# Patient Record
Sex: FEMALE | Race: WHITE | URBAN - METROPOLITAN AREA
[De-identification: names, ages, dates, MRNs, and addresses within clinical notes are randomized per-mention and may not be internally consistent; named-entity substitution may affect disease eponyms.]

---

## 2023-04-26 ENCOUNTER — EMERGENCY (EMERGENCY)
Facility: HOSPITAL | Age: 78
LOS: 0 days | Discharge: LEFT BEFORE DISCHARGE | End: 2023-04-26
Attending: EMERGENCY MEDICINE
Payer: MEDICARE

## 2023-04-26 VITALS
RESPIRATION RATE: 12 BRPM | SYSTOLIC BLOOD PRESSURE: 105 MMHG | TEMPERATURE: 98 F | HEART RATE: 71 BPM | OXYGEN SATURATION: 98 % | DIASTOLIC BLOOD PRESSURE: 78 MMHG

## 2023-04-26 DIAGNOSIS — Z20.822 CONTACT WITH AND (SUSPECTED) EXPOSURE TO COVID-19: ICD-10-CM

## 2023-04-26 DIAGNOSIS — C77.0 SECONDARY AND UNSPECIFIED MALIGNANT NEOPLASM OF LYMPH NODES OF HEAD, FACE AND NECK: ICD-10-CM

## 2023-04-26 DIAGNOSIS — Z85.51 PERSONAL HISTORY OF MALIGNANT NEOPLASM OF BLADDER: ICD-10-CM

## 2023-04-26 DIAGNOSIS — R20.2 PARESTHESIA OF SKIN: ICD-10-CM

## 2023-04-26 DIAGNOSIS — R29.700 NIHSS SCORE 0: ICD-10-CM

## 2023-04-26 DIAGNOSIS — Z53.29 PROCEDURE AND TREATMENT NOT CARRIED OUT BECAUSE OF PATIENT'S DECISION FOR OTHER REASONS: ICD-10-CM

## 2023-04-26 DIAGNOSIS — R22.2 LOCALIZED SWELLING, MASS AND LUMP, TRUNK: ICD-10-CM

## 2023-04-26 DIAGNOSIS — I63.9 CEREBRAL INFARCTION, UNSPECIFIED: ICD-10-CM

## 2023-04-26 DIAGNOSIS — C78.4 SECONDARY MALIGNANT NEOPLASM OF SMALL INTESTINE: ICD-10-CM

## 2023-04-26 DIAGNOSIS — R79.89 OTHER SPECIFIED ABNORMAL FINDINGS OF BLOOD CHEMISTRY: ICD-10-CM

## 2023-04-26 DIAGNOSIS — L98.9 DISORDER OF THE SKIN AND SUBCUTANEOUS TISSUE, UNSPECIFIED: ICD-10-CM

## 2023-04-26 LAB
ALBUMIN SERPL ELPH-MCNC: 2.7 G/DL — LOW (ref 3.3–5)
ALP SERPL-CCNC: 73 U/L — SIGNIFICANT CHANGE UP (ref 40–120)
ALT FLD-CCNC: 24 U/L — SIGNIFICANT CHANGE UP (ref 12–78)
ANION GAP SERPL CALC-SCNC: 3 MMOL/L — LOW (ref 5–17)
APTT BLD: 29.5 SEC — SIGNIFICANT CHANGE UP (ref 27.5–35.5)
AST SERPL-CCNC: 24 U/L — SIGNIFICANT CHANGE UP (ref 15–37)
BASOPHILS # BLD AUTO: 0 K/UL — SIGNIFICANT CHANGE UP (ref 0–0.2)
BASOPHILS NFR BLD AUTO: 0 % — SIGNIFICANT CHANGE UP (ref 0–2)
BILIRUB SERPL-MCNC: 0.3 MG/DL — SIGNIFICANT CHANGE UP (ref 0.2–1.2)
BLD GP AB SCN SERPL QL: SIGNIFICANT CHANGE UP
BUN SERPL-MCNC: 32 MG/DL — HIGH (ref 7–23)
CALCIUM SERPL-MCNC: 9.2 MG/DL — SIGNIFICANT CHANGE UP (ref 8.5–10.1)
CHLORIDE SERPL-SCNC: 93 MMOL/L — LOW (ref 96–108)
CO2 SERPL-SCNC: 31 MMOL/L — SIGNIFICANT CHANGE UP (ref 22–31)
CREAT SERPL-MCNC: 0.95 MG/DL — SIGNIFICANT CHANGE UP (ref 0.5–1.3)
EGFR: 62 ML/MIN/1.73M2 — SIGNIFICANT CHANGE UP
EOSINOPHIL # BLD AUTO: 0.13 K/UL — SIGNIFICANT CHANGE UP (ref 0–0.5)
EOSINOPHIL NFR BLD AUTO: 3.3 % — SIGNIFICANT CHANGE UP (ref 0–6)
FLUAV AG NPH QL: SIGNIFICANT CHANGE UP
FLUBV AG NPH QL: SIGNIFICANT CHANGE UP
GLUCOSE SERPL-MCNC: 122 MG/DL — HIGH (ref 70–99)
HCT VFR BLD CALC: 33.4 % — LOW (ref 34.5–45)
HGB BLD-MCNC: 11 G/DL — LOW (ref 11.5–15.5)
IMM GRANULOCYTES NFR BLD AUTO: 0.5 % — SIGNIFICANT CHANGE UP (ref 0–0.9)
INR BLD: 1.04 RATIO — SIGNIFICANT CHANGE UP (ref 0.88–1.16)
LYMPHOCYTES # BLD AUTO: 1.44 K/UL — SIGNIFICANT CHANGE UP (ref 1–3.3)
LYMPHOCYTES # BLD AUTO: 36.1 % — SIGNIFICANT CHANGE UP (ref 13–44)
MAGNESIUM SERPL-MCNC: 1.7 MG/DL — SIGNIFICANT CHANGE UP (ref 1.6–2.6)
MCHC RBC-ENTMCNC: 29.7 PG — SIGNIFICANT CHANGE UP (ref 27–34)
MCHC RBC-ENTMCNC: 32.9 GM/DL — SIGNIFICANT CHANGE UP (ref 32–36)
MCV RBC AUTO: 90.3 FL — SIGNIFICANT CHANGE UP (ref 80–100)
MONOCYTES # BLD AUTO: 0.07 K/UL — SIGNIFICANT CHANGE UP (ref 0–0.9)
MONOCYTES NFR BLD AUTO: 1.8 % — LOW (ref 2–14)
NEUTROPHILS # BLD AUTO: 2.33 K/UL — SIGNIFICANT CHANGE UP (ref 1.8–7.4)
NEUTROPHILS NFR BLD AUTO: 58.3 % — SIGNIFICANT CHANGE UP (ref 43–77)
PLATELET # BLD AUTO: 274 K/UL — SIGNIFICANT CHANGE UP (ref 150–400)
POTASSIUM SERPL-MCNC: 4.3 MMOL/L — SIGNIFICANT CHANGE UP (ref 3.5–5.3)
POTASSIUM SERPL-SCNC: 4.3 MMOL/L — SIGNIFICANT CHANGE UP (ref 3.5–5.3)
PROT SERPL-MCNC: 7.4 GM/DL — SIGNIFICANT CHANGE UP (ref 6–8.3)
PROTHROM AB SERPL-ACNC: 12.1 SEC — SIGNIFICANT CHANGE UP (ref 10.5–13.4)
RBC # BLD: 3.7 M/UL — LOW (ref 3.8–5.2)
RBC # FLD: 13.5 % — SIGNIFICANT CHANGE UP (ref 10.3–14.5)
RSV RNA NPH QL NAA+NON-PROBE: SIGNIFICANT CHANGE UP
SARS-COV-2 RNA SPEC QL NAA+PROBE: SIGNIFICANT CHANGE UP
SODIUM SERPL-SCNC: 127 MMOL/L — LOW (ref 135–145)
TROPONIN I, HIGH SENSITIVITY RESULT: 54.57 NG/L — HIGH
WBC # BLD: 3.99 K/UL — SIGNIFICANT CHANGE UP (ref 3.8–10.5)
WBC # FLD AUTO: 3.99 K/UL — SIGNIFICANT CHANGE UP (ref 3.8–10.5)

## 2023-04-26 PROCEDURE — 84484 ASSAY OF TROPONIN QUANT: CPT

## 2023-04-26 PROCEDURE — 85730 THROMBOPLASTIN TIME PARTIAL: CPT

## 2023-04-26 PROCEDURE — 86850 RBC ANTIBODY SCREEN: CPT

## 2023-04-26 PROCEDURE — 93010 ELECTROCARDIOGRAM REPORT: CPT

## 2023-04-26 PROCEDURE — 85610 PROTHROMBIN TIME: CPT

## 2023-04-26 PROCEDURE — 83735 ASSAY OF MAGNESIUM: CPT

## 2023-04-26 PROCEDURE — 70450 CT HEAD/BRAIN W/O DYE: CPT | Mod: MA

## 2023-04-26 PROCEDURE — 36415 COLL VENOUS BLD VENIPUNCTURE: CPT

## 2023-04-26 PROCEDURE — 70450 CT HEAD/BRAIN W/O DYE: CPT | Mod: 26,MA

## 2023-04-26 PROCEDURE — 71045 X-RAY EXAM CHEST 1 VIEW: CPT

## 2023-04-26 PROCEDURE — 85025 COMPLETE CBC W/AUTO DIFF WBC: CPT

## 2023-04-26 PROCEDURE — 71045 X-RAY EXAM CHEST 1 VIEW: CPT | Mod: 26

## 2023-04-26 PROCEDURE — 99285 EMERGENCY DEPT VISIT HI MDM: CPT | Mod: 25

## 2023-04-26 PROCEDURE — 80053 COMPREHEN METABOLIC PANEL: CPT

## 2023-04-26 PROCEDURE — 86900 BLOOD TYPING SEROLOGIC ABO: CPT

## 2023-04-26 PROCEDURE — 99285 EMERGENCY DEPT VISIT HI MDM: CPT

## 2023-04-26 PROCEDURE — 0241U: CPT

## 2023-04-26 PROCEDURE — 93005 ELECTROCARDIOGRAM TRACING: CPT

## 2023-04-26 PROCEDURE — 86901 BLOOD TYPING SEROLOGIC RH(D): CPT

## 2023-04-26 RX ORDER — SODIUM CHLORIDE 9 MG/ML
3 INJECTION INTRAMUSCULAR; INTRAVENOUS; SUBCUTANEOUS ONCE
Refills: 0 | Status: COMPLETED | OUTPATIENT
Start: 2023-04-26 | End: 2023-04-26

## 2023-04-26 RX ADMIN — SODIUM CHLORIDE 3 MILLILITER(S): 9 INJECTION INTRAMUSCULAR; INTRAVENOUS; SUBCUTANEOUS at 18:35

## 2023-04-26 NOTE — ED PROVIDER NOTE - CONSTITUTIONAL, MLM
normal... Well appearing, awake, alert, oriented to person, place, time/situation and in no apparent distress. Older white female, alert no respiratory discomfort, non toxic. Older white female, alert no respiratory discomfort, non-toxic.

## 2023-04-26 NOTE — ED PROVIDER NOTE - CARDIAC, MLM
Normal rate, regular rhythm.  Heart sounds S1, S2.  No murmurs, rubs or gallops. Regular rate/rhythm, normal radial pulse.

## 2023-04-26 NOTE — ED PROVIDER NOTE - MUSCULOSKELETAL, MLM
Spine appears normal, range of motion is not limited, no muscle or joint tenderness MAEx4 no focal swelling/tenderness.

## 2023-04-26 NOTE — ED PROVIDER NOTE - CCCP TRG CHIEF CMPLNT
Sent patient information letter for a missed appointment on 7/1/21 with Leana Regalado.   see chief complaint quote

## 2023-04-26 NOTE — ED ADULT NURSE NOTE - OBJECTIVE STATEMENT
Pt was heaving tingling to left hand one week ago ,pt had MRI today showing a stroke. pt is ambulatory with steady gait no weakness noted on left arm.pt is currently on chemo due to kidney cancer diagnosed 6 weeks ago.

## 2023-04-26 NOTE — ED PROVIDER NOTE - ENMT, MLM
Airway patent, Nasal mucosa clear. Mouth with normal mucosa. Throat has no vesicles, no oropharyngeal exudates and uvula is midline. Oropharynx clear, MM slightly dry.

## 2023-04-26 NOTE — ED PROVIDER NOTE - NEUROLOGICAL, MLM
Alert and oriented, no focal deficits, no motor or sensory deficits. Alert and oriented x3, C2-12 intact, no obvious focal/motor/sensory deficits. NIHSS = 0. Alert and oriented x3, C2-12 intact, no obvious focal/motor/sensory deficits. NIHSS = 0.  Speech normal.

## 2023-04-26 NOTE — ED ADULT NURSE REASSESSMENT NOTE - NS ED NURSE REASSESS COMMENT FT1
pt highly agitated on being here for 4 hours and not seeing a neurologist. after multiple attempts to redirect pt back to room, explaining CT and lab work, pt refuses to stay any longer and is demanding to leave. Fully dressed in room, IV removed and MD aware. AMA paperwork started
daughter at bedside states "she doesn't think they're an emergency" (referring to mother) and states that they need to go immediately. pt states "if I sit here long enough, and you dig around of course you'll find something wrong. pt not redirectable to room, IV removed

## 2023-04-26 NOTE — ED ADULT NURSE NOTE - CHIEF COMPLAINT QUOTE
pt ambulatory to triage from [facility] due to small R scute infarct on MRI this morning. pt c/o L hand tingling x1.5 weeks. pmh urothelial carcinoma (last chemo treatment on friday). -blood thinner -allergies

## 2023-04-26 NOTE — ED PROVIDER NOTE - CLINICAL SUMMARY MEDICAL DECISION MAKING FREE TEXT BOX
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 Pt was told to come to [facility] for evaluation. Pt endorses 1-1.5 weeks intermittent left hand tingling. VS stable, physical exam unremarkable including NIHSS =0. Plan CT head, CXR, EKG, labs including coags, troponin dysphasia screen discuss with tele-stroke neurology. 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 Pt was told to come to [facility] for evaluation. Pt endorses 1-1.5 weeks intermittent left hand tingling. VS stable, physical exam unremarkable including NIHSS =0. Plan CT head, CXR, EKG, labs including coags, troponin dysphagia screen, discuss with tele-stroke neurology. Pt outside of thrombolytic time window not a candidate for thrombolytics. 76 y/o female with a PMHx of urothelial CA with mets to small intestine and lymph nodes to the neck, last chemo on Friday, 4/21 presents to the ED from [facility] s/p outpatient MRI done today for r/o brain tumor, which instead showed small acute right frontal infarct. Pt was told to come to [facility] for evaluation. Pt endorses 1-1.5 weeks intermittent left hand tingling.   VS stable, physical exam unremarkable including NIHSS =0.   Plan: CT head, CXR, EKG, labs including coags, Troponin, dysphagia screen, discuss with Tele-Stroke Neurology. Pt outside of thrombolytic time window: not a candidate for thrombolytics.    See Progress Notes for case progression, disposition.

## 2023-04-26 NOTE — ED PROVIDER NOTE - NSFOLLOWUPINSTRUCTIONS_ED_ALL_ED_FT
You were advised hospital admission for further monitoring, repeat heart enzyme blood tests, inpatient formal Neurology evaluation, but you refused.  Continue current medications.  Follow up next 2 days with own primary doctor & [facility] center: call tomorrow.  Follow up with Neurology.    By signing out against medical advice, you accept FULL You were advised hospital admission for further monitoring, repeat heart enzyme blood tests, inpatient formal Neurology evaluation, but you refused & signed out Against Medical Advice.  Continue current medications.  Follow up next 2 days with own primary doctor & [facility] center: call tomorrow.  Follow up with Neurology.    By signing out Against Medical Advice, you accept FULL responsibility for any & all adverse consequences, including (but not limited to), worsening of stroke, arm/leg weakness, face weakness, speech/vision abnormality, a heart attack, even death.  You are advised to return immediately back to nearest ED if any of these symptoms should occur.  Otherwise, you are advised STRONGLY to closely follow up with your own doctors within next 2 days: call offices tomorrow.      Ischemic Stroke    An ischemic stroke (cerebrovascular accident, CVA) occurs when an area of the brain does not get enough blood flow. This leads to the sudden death of brain tissue and can cause brain damage. An ischemic stroke is a medical emergency. It must be treated right away.    What are the causes?  This condition is caused by a decrease of blood flow to a part of the brain. This may be due to:  A small blood clot (embolus).  A buildup of plaque in the blood vessels (atherosclerosis). This blocks blood flow in the brain.  An abnormal heart rhythm, called atrial fibrillation (AFib). This sends a small blood clot to the brain.  A blocked or damaged artery in the head or neck.  Certain infections.  Inflammation of the arteries in the brain (vasculitis).  Sometimes, the cause of ischemic stroke is not known.    What increases the risk?  The following medical conditions may increase your risk of a stroke:  High blood pressure (hypertension).  Heart disease.  Diabetes.  High cholesterol.  Obesity.  Sleep problems (sleep apnea).  Other risk factors that you can change include:  Using products that contain nicotine or tobacco.  Not being active.  Heavy use of alcohol or drugs, especially cocaine and methamphetamine.  Taking birth control pills, especially if you also use tobacco.  Risk factors that you cannot change include:  Being older than age 60.  Having a history of blood clots, stroke, or mini-stroke (transient ischemic attack, TIA).  Having a family history of stroke.  What are the signs or symptoms?  Symptoms of this condition usually develop suddenly, or you may notice them after waking from sleep. These may include:  Weakness or numbness of your face, arm, or leg, especially on one side of your body.  Loss of balance or coordination.  Slurred speech, trouble speaking, trouble understanding speech, or a combination of these.  Vision changes. You may have double vision, blurred vision, or loss of vision.  Dizziness or confusion.  Nausea and vomiting.  Severe headache.  If possible, write down the exact time your symptoms started. Tell your health care provider.    If symptoms come and go, they could be signs of a TIA. Get help right away, even if you feel better.    How is this diagnosed?  This condition may be diagnosed based on:  Your symptoms, your medical history, and a physical exam.  CT scan of the brain.  MRI.  Imaging tests that scan blood flow in the brain (CT angiogram, MRI angiogram, or cerebral angiogram).  You may also have other tests, including:  Electrocardiogram (ECG).  Continuous heart monitoring.  Transthoracic echocardiogram (TTE).  Transesophageal echocardiogram (RAJEEV).  Carotid ultrasound.  Blood tests.  Sleep study to check for sleep apnea.  You may need to see a health care provider who specializes in stroke care.    How is this treated?  Treatment for this condition depends on the area of the brain affected and the cause of your symptoms. Some treatments work better if they are done within 3–6 hours of the start of symptoms. These may include:  Medicine that is injected to dissolve the blood clot.  Treatments given directly to the affected artery to remove or dissolve the blood clot.  Medicines to control blood pressure.  Medicines to thin the blood (anticoagulant or antiplatelet).  Other treatments may include:  Oxygen.  IV fluids.  Procedures to increase blood flow.  After a stroke, you may work with physical, speech, mental health, or occupational therapists to help you recover.    Follow these instructions at home:  Medicines    Take over-the-counter and prescription medicines only as told by your health care provider.  If you were told to take a medicine to thin your blood, take it exactly as told, at the same time every day.  Taking too much of a blood thinner can cause bleeding. Taking too little may not protect you against a stroke and other problems.  If you were not prescribed medicines that contain aspirin or NSAIDs, such as ibuprofen, talk with your health care provider before you take any of these. These medicines increase your risk for dangerous bleeding.  When taking a blood thinner, do these things:  Hold pressure over any cuts that bleed for longer than usual.  Tell your dentist and other health care providers that you are taking anticoagulants before having any procedures that may cause bleeding.  Avoid activities that could cause injury or bruising.  Wear a medical alert bracelet or carry a card that lists what medicines you take.  Eating and drinking    Follow instructions from your health care provider about diet.  Eat healthy foods.  If your stroke affected your ability to swallow, you may need to take steps to avoid choking. These may include:  Taking small bites of food.  Eating soft or pureed foods.  Safety    Follow instructions from your health care team about physical activity.  Use a walker or cane as told by your health care provider.  Take steps to lower your risk of falls at home. These may include:  Installing grab bars in the bedroom and bathroom.  Using raised toilets and putting a seat in the shower.  Removing clutter and tripping hazards, such as cords or area rugs.  General instructions    Do not use any products that contain nicotine or tobacco. These include cigarettes, chewing tobacco, and vaping devices, such as e-cigarettes. If you need help quitting, ask your health care provider.  If you drink alcohol:  Limit how much you have to:  0–1 drink a day for women who are not pregnant.  0–2 drinks a day for men.  Know how much alcohol is in your drink. In the [place] one drink equals one 12 oz bottle of beer (355 mL), one 5 oz glass of wine (148 mL), or one 1½ oz glass of hard liquor (44 mL).  Keep all follow-up visits. This is important.  How is this prevented?  You can lower your risk of another stroke by managing these conditions:  High blood pressure.  High cholesterol.  Diabetes.  Heart disease.  Sleep apnea.  Obesity.  Quitting smoking, limiting alcohol, and staying physically active also will reduce your risk.    Your health care provider will continue to help you with ways to prevent short-term and long-term problems caused by stroke.    Get help right away if:    You have any symptoms of a stroke. "BE FAST" is an easy way to remember the main warning signs of a stroke:  B - Balance. Signs are dizziness, sudden trouble walking, or loss of balance.  E - Eyes. Signs are trouble seeing or a sudden change in vision.  F - Face. Signs are sudden weakness or numbness of the face, or the face or eyelid drooping on one side.  A - Arms. Signs are weakness or numbness in an arm. This happens suddenly and usually on one side of the body.  S - Speech. Signs are sudden trouble speaking, slurred speech, or trouble understanding what people say.  T - Time. Time to call emergency services. Write down what time symptoms started.  You have other signs of a stroke, such as:  A sudden, severe headache with no known cause.  Nausea or vomiting.  Seizure.  These symptoms may represent a serious problem that is an emergency. Do not wait to see if the symptoms will go away. Get medical help right away. Call your local emergency services (911 in the [place] Do not drive yourself to the hospital.    Summary  An ischemic stroke (cerebrovascular accident, CVA) occurs when an area of the brain does not get enough blood flow.  Symptoms of this condition usually develop suddenly, or you may notice them after waking from sleep.  It is very important to get treatment at the first sign of stroke symptoms. Stroke is a medical emergency that must be treated right away.  This information is not intended to replace advice given to you by your health care provider. Make sure you discuss any questions you have with your health care provider.

## 2023-04-26 NOTE — ED PROVIDER NOTE - GASTROINTESTINAL, MLM
Abdomen soft, non-tender, no guarding. Bowel sounds hypoactive, normal pitch. Mild tenderness LLQ, left lateral abdomen, chronic per pt from known mass. Bowel sounds hypoactive, normal pitch. Mild tenderness LLQ & left lateral abdomen, chronic per pt from known mass.

## 2023-04-26 NOTE — ED PROVIDER NOTE - PROGRESS NOTE DETAILS
Jelena García for attending Dr. Amato: Case discussed with tele-stroke neurology PA: agrees not candidate for thrombolytics, as it is passed the time window and known mets lesions in abd, advises admission for in-patient neuro evaluation in morning, serial troponin no suspicion for LVO, no indication for transfer. NEMO Amato MD: NEMO Amato MD:  Pt informed that case was d/w Tele-Stroke Neurology: given her outpt MRI showing + CVA & ED Troponin level elevated, it is medically advised that she be formally admitted into hospital for further testing, monitoring & formal Neurology inpt consult in AM.  Pt expressed her understanding, but adamantly refuses admission & is insistent on going home.  Pt agrees with signing out AMA.    The pt is clinically sober, A&Ox3, free from distracting injury.  Throughout our interactions in the ED today, the pt has demonstrated concrete thinking/reasoning, has maintained an orderly/reasonable conversation, appears to have intact insight/judgment/reason, a sound mind and therefore in our opinion has capacity now to make decisions.  Given the pt’s presentation, we communicated (in laymen's terms) our concern for   _stroke with elevated heart enzyme levels_________________.    The pt verbalized an understanding of our worries.  We’ve communicated to the patient that the ED evaluation is incomplete & many troublesome conditions haven’t been r/o.  We have discussed the need for further ED w/u so we can get more information about the pt’s condition.  We have discussed the range of possible dx, potential testing & tx options.  Our discussions included the potential outcomes of leaving AMA, including worsening of their condition, becoming permanently disabled/in pain/critically ill, or death.  Despite these efforts, we were unable to convince the pt to stay.  The pt is refusing any further care and is leaving against medical advice. We have attempted to offer tx/rx/guidance for any dangerous conditions which are most likely and/or dangerous.  We have answered all questions and have implored the pt to return ASAP to complete the w/u. Jelena García for attending Dr. Amato: Case discussed with tele-stroke neurology PA: agrees not candidate for thrombolytics, as it is passed the time window and + known mets lesions in abd, advises admission for in-patient neuro evaluation in morning, serial troponins, no suspicion for LVO, no indication for transfer. C MD [place]:  Pt signed AMA form but left prior to receiving formal aftercare instructions papers. Jelena García for attending Dr. Amato: Case discussed with Tele-Stroke Neurology PA: agrees not candidate for thrombolytics, as it is past the time window and + known mets lesions in abd, advises admission for in-patient Neuro evaluation in morning, serial troponins; no suspicion for LVO, no indication for transfer.

## 2023-04-26 NOTE — ED PROVIDER NOTE - OBJECTIVE STATEMENT
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 Pt was told to come to [facility] for evaluation. Pt endorses 1-1.5 weeks intermittent left hand tangling, no specific weakness, no numbness, no dizziness upon ambulating, no fever, no nausea, no vomiting. Pt states 2 days ago, she felt pulsating vision changes, episode lasted a few minutes, self resolved. Pt endorses some difficulty swallowing attributed to the lymph node infiltration and biopsy. Pt not on AC meds. Oncologist: Dr. Kaelyn Marvin. 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 Pt was told to come to [facility] for evaluation. Pt endorses 1-1.5 weeks intermittent left hand tingling, no specific weakness, no numbness, no dizziness upon ambulating, no fever, no nausea, no vomiting. Pt states 2 days ago, she felt pulsating vision changes, episode lasted a few minutes, self resolved. Pt endorses some difficulty swallowing attributed to the lymph node infiltration and biopsy. Pt not on AC meds. Oncologist: Dr. Kaelyn Marvin. 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 Pt was told to come to [facility] for evaluation. Pt endorses 1-1.5 weeks intermittent left hand tingling, no specific face/extremity weakness, no numbness, no dizziness upon ambulating, no fever, no nausea, no vomiting, no HA. Pt states 2 days ago, she felt "pulsating vision changes", episode lasted a few minutes, self-resolved w/o recurrence. Pt endorses some difficulty swallowing attributed to the lymph node infiltration and biopsy, no speech changes. Pt not on AC meds.   Oncologist: Dr. Kaelyn Marvin.

## 2023-04-26 NOTE — ED PROVIDER NOTE - EYES, MLM
Clear bilaterally, pupils equal, round and reactive to light. Patient unable to complete Clear bilaterally, pupils equal, round and reactive to light.  EOMI

## 2023-04-26 NOTE — ED ADULT TRIAGE NOTE - CHIEF COMPLAINT QUOTE
pt ambulatory to triage [place] [facility] due to small R scute infarct on MRI this morning. pt c/o L hand tingling x1.5 weeks. pmh urothelial carcinoma (last chemo treatment on friday). -blood thinner -allergies

## 2023-04-26 NOTE — ED PROVIDER NOTE - PATIENT PORTAL LINK FT
You can access the FollowMyHealth Patient Portal offered by [facility] by registering at the following website: [email or web address]. By joining [employer]’s FollowMyHealth portal, you will also be able to view your health information using other applications (apps) compatible with our system.

## 2023-12-19 ENCOUNTER — INPATIENT (INPATIENT)
Facility: HOSPITAL | Age: 78
LOS: 2 days | Discharge: HOME HEALTH | DRG: 871 | End: 2023-12-22
Attending: HOSPITALIST | Admitting: INTERNAL MEDICINE
Payer: MEDICARE

## 2023-12-19 VITALS
HEART RATE: 96 BPM | DIASTOLIC BLOOD PRESSURE: 69 MMHG | RESPIRATION RATE: 17 BRPM | SYSTOLIC BLOOD PRESSURE: 80 MMHG | OXYGEN SATURATION: 90 %

## 2023-12-19 DIAGNOSIS — J18.9 PNEUMONIA, UNSPECIFIED ORGANISM: ICD-10-CM

## 2023-12-19 LAB
ALBUMIN SERPL ELPH-MCNC: 2.1 G/DL — LOW (ref 3.3–5)
ALBUMIN SERPL ELPH-MCNC: 2.1 G/DL — LOW (ref 3.3–5)
ALP SERPL-CCNC: 164 U/L — HIGH (ref 40–120)
ALP SERPL-CCNC: 164 U/L — HIGH (ref 40–120)
ALT FLD-CCNC: 18 U/L — SIGNIFICANT CHANGE UP (ref 12–78)
ALT FLD-CCNC: 18 U/L — SIGNIFICANT CHANGE UP (ref 12–78)
ANION GAP SERPL CALC-SCNC: 5 MMOL/L — SIGNIFICANT CHANGE UP (ref 5–17)
ANION GAP SERPL CALC-SCNC: 5 MMOL/L — SIGNIFICANT CHANGE UP (ref 5–17)
ANISOCYTOSIS BLD QL: SLIGHT — SIGNIFICANT CHANGE UP
ANISOCYTOSIS BLD QL: SLIGHT — SIGNIFICANT CHANGE UP
APPEARANCE UR: CLEAR — SIGNIFICANT CHANGE UP
APPEARANCE UR: CLEAR — SIGNIFICANT CHANGE UP
APTT BLD: 29.1 SEC — SIGNIFICANT CHANGE UP (ref 24.5–35.6)
APTT BLD: 29.1 SEC — SIGNIFICANT CHANGE UP (ref 24.5–35.6)
AST SERPL-CCNC: 21 U/L — SIGNIFICANT CHANGE UP (ref 15–37)
AST SERPL-CCNC: 21 U/L — SIGNIFICANT CHANGE UP (ref 15–37)
BACTERIA # UR AUTO: NEGATIVE /HPF — SIGNIFICANT CHANGE UP
BACTERIA # UR AUTO: NEGATIVE /HPF — SIGNIFICANT CHANGE UP
BASOPHILS # BLD AUTO: 0.05 K/UL — SIGNIFICANT CHANGE UP (ref 0–0.2)
BASOPHILS # BLD AUTO: 0.05 K/UL — SIGNIFICANT CHANGE UP (ref 0–0.2)
BASOPHILS NFR BLD AUTO: 0.4 % — SIGNIFICANT CHANGE UP (ref 0–2)
BASOPHILS NFR BLD AUTO: 0.4 % — SIGNIFICANT CHANGE UP (ref 0–2)
BILIRUB SERPL-MCNC: 0.6 MG/DL — SIGNIFICANT CHANGE UP (ref 0.2–1.2)
BILIRUB SERPL-MCNC: 0.6 MG/DL — SIGNIFICANT CHANGE UP (ref 0.2–1.2)
BILIRUB UR-MCNC: NEGATIVE — SIGNIFICANT CHANGE UP
BILIRUB UR-MCNC: NEGATIVE — SIGNIFICANT CHANGE UP
BUN SERPL-MCNC: 29 MG/DL — HIGH (ref 7–23)
BUN SERPL-MCNC: 29 MG/DL — HIGH (ref 7–23)
CALCIUM SERPL-MCNC: 7.9 MG/DL — LOW (ref 8.5–10.1)
CALCIUM SERPL-MCNC: 7.9 MG/DL — LOW (ref 8.5–10.1)
CAST: 2 /LPF — SIGNIFICANT CHANGE UP (ref 0–4)
CAST: 2 /LPF — SIGNIFICANT CHANGE UP (ref 0–4)
CHLORIDE SERPL-SCNC: 101 MMOL/L — SIGNIFICANT CHANGE UP (ref 96–108)
CHLORIDE SERPL-SCNC: 101 MMOL/L — SIGNIFICANT CHANGE UP (ref 96–108)
CO2 SERPL-SCNC: 26 MMOL/L — SIGNIFICANT CHANGE UP (ref 22–31)
CO2 SERPL-SCNC: 26 MMOL/L — SIGNIFICANT CHANGE UP (ref 22–31)
COLOR SPEC: YELLOW — SIGNIFICANT CHANGE UP
COLOR SPEC: YELLOW — SIGNIFICANT CHANGE UP
CREAT SERPL-MCNC: 1.67 MG/DL — HIGH (ref 0.5–1.3)
CREAT SERPL-MCNC: 1.67 MG/DL — HIGH (ref 0.5–1.3)
DIFF PNL FLD: ABNORMAL
DIFF PNL FLD: ABNORMAL
EGFR: 32 ML/MIN/1.73M2 — LOW
EGFR: 32 ML/MIN/1.73M2 — LOW
EOSINOPHIL # BLD AUTO: 0.02 K/UL — SIGNIFICANT CHANGE UP (ref 0–0.5)
EOSINOPHIL # BLD AUTO: 0.02 K/UL — SIGNIFICANT CHANGE UP (ref 0–0.5)
EOSINOPHIL NFR BLD AUTO: 0.1 % — SIGNIFICANT CHANGE UP (ref 0–6)
EOSINOPHIL NFR BLD AUTO: 0.1 % — SIGNIFICANT CHANGE UP (ref 0–6)
FLUAV AG NPH QL: SIGNIFICANT CHANGE UP
FLUAV AG NPH QL: SIGNIFICANT CHANGE UP
FLUBV AG NPH QL: SIGNIFICANT CHANGE UP
FLUBV AG NPH QL: SIGNIFICANT CHANGE UP
GLUCOSE SERPL-MCNC: 176 MG/DL — HIGH (ref 70–99)
GLUCOSE SERPL-MCNC: 176 MG/DL — HIGH (ref 70–99)
GLUCOSE UR QL: NEGATIVE MG/DL — SIGNIFICANT CHANGE UP
GLUCOSE UR QL: NEGATIVE MG/DL — SIGNIFICANT CHANGE UP
HCT VFR BLD CALC: 25.5 % — LOW (ref 34.5–45)
HCT VFR BLD CALC: 25.5 % — LOW (ref 34.5–45)
HGB BLD-MCNC: 8.1 G/DL — LOW (ref 11.5–15.5)
HGB BLD-MCNC: 8.1 G/DL — LOW (ref 11.5–15.5)
IMM GRANULOCYTES NFR BLD AUTO: 0.4 % — SIGNIFICANT CHANGE UP (ref 0–0.9)
IMM GRANULOCYTES NFR BLD AUTO: 0.4 % — SIGNIFICANT CHANGE UP (ref 0–0.9)
INR BLD: 1.07 RATIO — SIGNIFICANT CHANGE UP (ref 0.85–1.18)
INR BLD: 1.07 RATIO — SIGNIFICANT CHANGE UP (ref 0.85–1.18)
KETONES UR-MCNC: NEGATIVE MG/DL — SIGNIFICANT CHANGE UP
KETONES UR-MCNC: NEGATIVE MG/DL — SIGNIFICANT CHANGE UP
LACTATE SERPL-SCNC: 2 MMOL/L — SIGNIFICANT CHANGE UP (ref 0.7–2)
LACTATE SERPL-SCNC: 2 MMOL/L — SIGNIFICANT CHANGE UP (ref 0.7–2)
LEUKOCYTE ESTERASE UR-ACNC: ABNORMAL
LEUKOCYTE ESTERASE UR-ACNC: ABNORMAL
LYMPHOCYTES # BLD AUTO: 0.61 K/UL — LOW (ref 1–3.3)
LYMPHOCYTES # BLD AUTO: 0.61 K/UL — LOW (ref 1–3.3)
LYMPHOCYTES # BLD AUTO: 4.4 % — LOW (ref 13–44)
LYMPHOCYTES # BLD AUTO: 4.4 % — LOW (ref 13–44)
MACROCYTES BLD QL: SLIGHT — SIGNIFICANT CHANGE UP
MACROCYTES BLD QL: SLIGHT — SIGNIFICANT CHANGE UP
MCHC RBC-ENTMCNC: 29.7 PG — SIGNIFICANT CHANGE UP (ref 27–34)
MCHC RBC-ENTMCNC: 29.7 PG — SIGNIFICANT CHANGE UP (ref 27–34)
MCHC RBC-ENTMCNC: 31.8 GM/DL — LOW (ref 32–36)
MCHC RBC-ENTMCNC: 31.8 GM/DL — LOW (ref 32–36)
MCV RBC AUTO: 93.4 FL — SIGNIFICANT CHANGE UP (ref 80–100)
MCV RBC AUTO: 93.4 FL — SIGNIFICANT CHANGE UP (ref 80–100)
MICROCYTES BLD QL: SLIGHT — SIGNIFICANT CHANGE UP
MICROCYTES BLD QL: SLIGHT — SIGNIFICANT CHANGE UP
MONOCYTES # BLD AUTO: 0.99 K/UL — HIGH (ref 0–0.9)
MONOCYTES # BLD AUTO: 0.99 K/UL — HIGH (ref 0–0.9)
MONOCYTES NFR BLD AUTO: 7.2 % — SIGNIFICANT CHANGE UP (ref 2–14)
MONOCYTES NFR BLD AUTO: 7.2 % — SIGNIFICANT CHANGE UP (ref 2–14)
NEUTROPHILS # BLD AUTO: 12.12 K/UL — HIGH (ref 1.8–7.4)
NEUTROPHILS # BLD AUTO: 12.12 K/UL — HIGH (ref 1.8–7.4)
NEUTROPHILS NFR BLD AUTO: 87.5 % — HIGH (ref 43–77)
NEUTROPHILS NFR BLD AUTO: 87.5 % — HIGH (ref 43–77)
NITRITE UR-MCNC: NEGATIVE — SIGNIFICANT CHANGE UP
NITRITE UR-MCNC: NEGATIVE — SIGNIFICANT CHANGE UP
NRBC # BLD: 0 /100 WBCS — SIGNIFICANT CHANGE UP (ref 0–0)
NRBC # BLD: 0 /100 WBCS — SIGNIFICANT CHANGE UP (ref 0–0)
PH UR: 5 — SIGNIFICANT CHANGE UP (ref 5–8)
PH UR: 5 — SIGNIFICANT CHANGE UP (ref 5–8)
PLAT MORPH BLD: NORMAL — SIGNIFICANT CHANGE UP
PLAT MORPH BLD: NORMAL — SIGNIFICANT CHANGE UP
PLATELET # BLD AUTO: 174 K/UL — SIGNIFICANT CHANGE UP (ref 150–400)
PLATELET # BLD AUTO: 174 K/UL — SIGNIFICANT CHANGE UP (ref 150–400)
POLYCHROMASIA BLD QL SMEAR: SLIGHT — SIGNIFICANT CHANGE UP
POLYCHROMASIA BLD QL SMEAR: SLIGHT — SIGNIFICANT CHANGE UP
POTASSIUM SERPL-MCNC: 3.9 MMOL/L — SIGNIFICANT CHANGE UP (ref 3.5–5.3)
POTASSIUM SERPL-MCNC: 3.9 MMOL/L — SIGNIFICANT CHANGE UP (ref 3.5–5.3)
POTASSIUM SERPL-SCNC: 3.9 MMOL/L — SIGNIFICANT CHANGE UP (ref 3.5–5.3)
POTASSIUM SERPL-SCNC: 3.9 MMOL/L — SIGNIFICANT CHANGE UP (ref 3.5–5.3)
PROT SERPL-MCNC: 5.6 GM/DL — LOW (ref 6–8.3)
PROT SERPL-MCNC: 5.6 GM/DL — LOW (ref 6–8.3)
PROT UR-MCNC: NEGATIVE MG/DL — SIGNIFICANT CHANGE UP
PROT UR-MCNC: NEGATIVE MG/DL — SIGNIFICANT CHANGE UP
PROTHROM AB SERPL-ACNC: 12.1 SEC — SIGNIFICANT CHANGE UP (ref 9.5–13)
PROTHROM AB SERPL-ACNC: 12.1 SEC — SIGNIFICANT CHANGE UP (ref 9.5–13)
RBC # BLD: 2.73 M/UL — LOW (ref 3.8–5.2)
RBC # BLD: 2.73 M/UL — LOW (ref 3.8–5.2)
RBC # FLD: 15.9 % — HIGH (ref 10.3–14.5)
RBC # FLD: 15.9 % — HIGH (ref 10.3–14.5)
RBC BLD AUTO: ABNORMAL
RBC BLD AUTO: ABNORMAL
RBC CASTS # UR COMP ASSIST: 8 /HPF — HIGH (ref 0–4)
RBC CASTS # UR COMP ASSIST: 8 /HPF — HIGH (ref 0–4)
RSV RNA NPH QL NAA+NON-PROBE: SIGNIFICANT CHANGE UP
RSV RNA NPH QL NAA+NON-PROBE: SIGNIFICANT CHANGE UP
SARS-COV-2 RNA SPEC QL NAA+PROBE: SIGNIFICANT CHANGE UP
SARS-COV-2 RNA SPEC QL NAA+PROBE: SIGNIFICANT CHANGE UP
SODIUM SERPL-SCNC: 132 MMOL/L — LOW (ref 135–145)
SODIUM SERPL-SCNC: 132 MMOL/L — LOW (ref 135–145)
SP GR SPEC: 1.01 — SIGNIFICANT CHANGE UP (ref 1–1.03)
SP GR SPEC: 1.01 — SIGNIFICANT CHANGE UP (ref 1–1.03)
SPHEROCYTES BLD QL SMEAR: SLIGHT — SIGNIFICANT CHANGE UP
SPHEROCYTES BLD QL SMEAR: SLIGHT — SIGNIFICANT CHANGE UP
SQUAMOUS # UR AUTO: 3 /HPF — SIGNIFICANT CHANGE UP (ref 0–5)
SQUAMOUS # UR AUTO: 3 /HPF — SIGNIFICANT CHANGE UP (ref 0–5)
UROBILINOGEN FLD QL: 0.2 MG/DL — SIGNIFICANT CHANGE UP (ref 0.2–1)
UROBILINOGEN FLD QL: 0.2 MG/DL — SIGNIFICANT CHANGE UP (ref 0.2–1)
WBC # BLD: 13.84 K/UL — HIGH (ref 3.8–10.5)
WBC # BLD: 13.84 K/UL — HIGH (ref 3.8–10.5)
WBC # FLD AUTO: 13.84 K/UL — HIGH (ref 3.8–10.5)
WBC # FLD AUTO: 13.84 K/UL — HIGH (ref 3.8–10.5)
WBC UR QL: 3 /HPF — SIGNIFICANT CHANGE UP (ref 0–5)
WBC UR QL: 3 /HPF — SIGNIFICANT CHANGE UP (ref 0–5)

## 2023-12-19 PROCEDURE — 86803 HEPATITIS C AB TEST: CPT

## 2023-12-19 PROCEDURE — 99232 SBSQ HOSP IP/OBS MODERATE 35: CPT

## 2023-12-19 PROCEDURE — 76770 US EXAM ABDO BACK WALL COMP: CPT

## 2023-12-19 PROCEDURE — 85025 COMPLETE CBC W/AUTO DIFF WBC: CPT

## 2023-12-19 PROCEDURE — 71045 X-RAY EXAM CHEST 1 VIEW: CPT | Mod: 26

## 2023-12-19 PROCEDURE — 83036 HEMOGLOBIN GLYCOSYLATED A1C: CPT

## 2023-12-19 PROCEDURE — 87086 URINE CULTURE/COLONY COUNT: CPT

## 2023-12-19 PROCEDURE — 80053 COMPREHEN METABOLIC PANEL: CPT

## 2023-12-19 PROCEDURE — 99291 CRITICAL CARE FIRST HOUR: CPT

## 2023-12-19 PROCEDURE — 84436 ASSAY OF TOTAL THYROXINE: CPT

## 2023-12-19 PROCEDURE — 81001 URINALYSIS AUTO W/SCOPE: CPT

## 2023-12-19 PROCEDURE — 86900 BLOOD TYPING SEROLOGIC ABO: CPT

## 2023-12-19 PROCEDURE — 82024 ASSAY OF ACTH: CPT

## 2023-12-19 PROCEDURE — 85610 PROTHROMBIN TIME: CPT

## 2023-12-19 PROCEDURE — P9016: CPT

## 2023-12-19 PROCEDURE — 86923 COMPATIBILITY TEST ELECTRIC: CPT

## 2023-12-19 PROCEDURE — 93306 TTE W/DOPPLER COMPLETE: CPT

## 2023-12-19 PROCEDURE — 84443 ASSAY THYROID STIM HORMONE: CPT

## 2023-12-19 PROCEDURE — 93010 ELECTROCARDIOGRAM REPORT: CPT

## 2023-12-19 PROCEDURE — 80061 LIPID PANEL: CPT

## 2023-12-19 PROCEDURE — 84439 ASSAY OF FREE THYROXINE: CPT

## 2023-12-19 PROCEDURE — 82533 TOTAL CORTISOL: CPT

## 2023-12-19 PROCEDURE — 0241U: CPT

## 2023-12-19 PROCEDURE — 86901 BLOOD TYPING SEROLOGIC RH(D): CPT

## 2023-12-19 PROCEDURE — 36415 COLL VENOUS BLD VENIPUNCTURE: CPT

## 2023-12-19 PROCEDURE — 71250 CT THORAX DX C-: CPT

## 2023-12-19 PROCEDURE — 84480 ASSAY TRIIODOTHYRONINE (T3): CPT

## 2023-12-19 PROCEDURE — 82746 ASSAY OF FOLIC ACID SERUM: CPT

## 2023-12-19 PROCEDURE — 82607 VITAMIN B-12: CPT

## 2023-12-19 PROCEDURE — 82728 ASSAY OF FERRITIN: CPT

## 2023-12-19 PROCEDURE — 86850 RBC ANTIBODY SCREEN: CPT

## 2023-12-19 PROCEDURE — 80048 BASIC METABOLIC PNL TOTAL CA: CPT

## 2023-12-19 PROCEDURE — 83540 ASSAY OF IRON: CPT

## 2023-12-19 PROCEDURE — 36430 TRANSFUSION BLD/BLD COMPNT: CPT

## 2023-12-19 PROCEDURE — 83550 IRON BINDING TEST: CPT

## 2023-12-19 PROCEDURE — 85730 THROMBOPLASTIN TIME PARTIAL: CPT

## 2023-12-19 RX ORDER — SODIUM CHLORIDE 9 MG/ML
2000 INJECTION INTRAMUSCULAR; INTRAVENOUS; SUBCUTANEOUS ONCE
Refills: 0 | Status: COMPLETED | OUTPATIENT
Start: 2023-12-19 | End: 2023-12-19

## 2023-12-19 RX ORDER — MAGNESIUM OXIDE/MAG AA CHELATE 133 MG
2 TABLET ORAL
Refills: 0 | DISCHARGE

## 2023-12-19 RX ORDER — VANCOMYCIN HCL 1 G
750 VIAL (EA) INTRAVENOUS ONCE
Refills: 0 | Status: COMPLETED | OUTPATIENT
Start: 2023-12-19 | End: 2023-12-19

## 2023-12-19 RX ORDER — LIDOCAINE 4 G/100G
1 CREAM TOPICAL
Refills: 0 | DISCHARGE

## 2023-12-19 RX ORDER — ASPIRIN/CALCIUM CARB/MAGNESIUM 324 MG
1 TABLET ORAL
Refills: 0 | DISCHARGE

## 2023-12-19 RX ORDER — CEFEPIME 1 G/1
2000 INJECTION, POWDER, FOR SOLUTION INTRAMUSCULAR; INTRAVENOUS ONCE
Refills: 0 | Status: DISCONTINUED | OUTPATIENT
Start: 2023-12-19 | End: 2023-12-19

## 2023-12-19 RX ORDER — CEFEPIME 1 G/1
2000 INJECTION, POWDER, FOR SOLUTION INTRAMUSCULAR; INTRAVENOUS ONCE
Refills: 0 | Status: COMPLETED | OUTPATIENT
Start: 2023-12-19 | End: 2023-12-19

## 2023-12-19 RX ORDER — LEVOTHYROXINE SODIUM 125 MCG
1 TABLET ORAL
Refills: 0 | DISCHARGE

## 2023-12-19 RX ORDER — SODIUM CHLORIDE 9 MG/ML
250 INJECTION INTRAMUSCULAR; INTRAVENOUS; SUBCUTANEOUS ONCE
Refills: 0 | Status: COMPLETED | OUTPATIENT
Start: 2023-12-19 | End: 2023-12-19

## 2023-12-19 RX ORDER — VANCOMYCIN HCL 1 G
1000 VIAL (EA) INTRAVENOUS ONCE
Refills: 0 | Status: DISCONTINUED | OUTPATIENT
Start: 2023-12-19 | End: 2023-12-19

## 2023-12-19 RX ORDER — ACETAMINOPHEN 500 MG
2 TABLET ORAL
Refills: 0 | DISCHARGE

## 2023-12-19 RX ADMIN — CEFEPIME 2000 MILLIGRAM(S): 1 INJECTION, POWDER, FOR SOLUTION INTRAMUSCULAR; INTRAVENOUS at 19:46

## 2023-12-19 RX ADMIN — SODIUM CHLORIDE 250 MILLILITER(S): 9 INJECTION INTRAMUSCULAR; INTRAVENOUS; SUBCUTANEOUS at 23:58

## 2023-12-19 RX ADMIN — Medication 250 MILLIGRAM(S): at 21:19

## 2023-12-19 RX ADMIN — Medication 300 UNIT(S): at 23:14

## 2023-12-19 RX ADMIN — SODIUM CHLORIDE 2000 MILLILITER(S): 9 INJECTION INTRAMUSCULAR; INTRAVENOUS; SUBCUTANEOUS at 19:03

## 2023-12-19 NOTE — PHARMACOTHERAPY INTERVENTION NOTE - COMMENTS
Medication reconciliation completed.  Patient provided list from [facility] of current medication and recent infusions; confirmed with patient & Dr. First Medx.  Pt was unable to confirm the names of all meds, only select meds.  Pt not sure if she's taking Olanzapine nor Pantoprazole and states that she weaned herself off the Oxycodone IR 5mg PRN and only uses the Oxycontin ER. Medication reconciliation completed.  Patient provided list from [facility] of current medication and recent infusions; confirmed with patient & Dr. First Medx.  Pt was unable to confirm the names of all meds, only select meds.  Pt not sure if she's taking Olanzapine nor Pantoprazole and states that she weaned herself off the Oxycodone IR 5mg PRN and only uses the Oxycontin ER.

## 2023-12-19 NOTE — ED PROVIDER NOTE - CRITICAL CARE ATTENDING CONTRIBUTION TO CARE
Jyoti LA M.D. independently provided 35 minutes of critical care including but not limited to talking to consultants, speaking with patient and family and reviewing lab and radiology results.

## 2023-12-19 NOTE — ED ADULT NURSE REASSESSMENT NOTE - NS ED NURSE REASSESS COMMENT FT1
Spoke with Dr. Randhawa about pt's unchanged BP. Will medicate per orders in MAR. No acute distress at this time, pt denies dizziness, SOB, chest pain. Safety measures maintained, stretcher locked and in lowest position. Pt remains on tele monitor, NSR with HR 84 bpm. Oxygen saturation 95% on room air.

## 2023-12-19 NOTE — ED PROVIDER NOTE - CLINICAL SUMMARY MEDICAL DECISION MAKING FREE TEXT BOX
79 y/o F hypotensive with possible PNA seen on outpatient CT. Plan: abx, sepsis order set, will admit for further work-up and treatment.

## 2023-12-19 NOTE — ED PROVIDER NOTE - PHYSICAL EXAMINATION
Constitutional: elderly F laying in bed, NAD  Eyes: PERRLA EOMI  Head: Normocephalic atraumatic  Mouth: MMM  Cardiac: regular rate, has port in RU chest  Resp: Lungs CTAB  GI: Abd s/nt/nd, no rebound or guarding.  Neuro: awake, alert, moving all extremities, cranial nerves 2-12 intact, sensation intact, no dysmetria.  Skin: No rashes

## 2023-12-19 NOTE — ED ADULT NURSE NOTE - NSFALLRISKINTERV_ED_ALL_ED
Assistance OOB with selected safe patient handling equipment if applicable/Assistance with ambulation/Communicate fall risk and risk factors to all staff, patient, and family/Provide visual cue: yellow wristband, yellow gown, etc/Reinforce activity limits and safety measures with patient and family/Call bell, personal items and telephone in reach/Instruct patient to call for assistance before getting out of bed/chair/stretcher/Non-slip footwear applied when patient is off stretcher/[place] to call system/Physically safe environment - no spills, clutter or unnecessary equipment/Purposeful Proactive Rounding/Room/bathroom lighting operational, light cord in reach Assistance OOB with selected safe patient handling equipment if applicable/Assistance with ambulation/Communicate fall risk and risk factors to all staff, patient, and family/Provide visual cue: yellow wristband, yellow gown, etc/Reinforce activity limits and safety measures with patient and family/Call bell, personal items and telephone in reach/Instruct patient to call for assistance before getting out of bed/chair/stretcher/Non-slip footwear applied when patient is off stretcher/[place] to call system/Physically safe environment - no spills, clutter or unnecessary equipment/Purposeful Proactive Rounding/Room/bathroom lighting operational, light cord in reach

## 2023-12-19 NOTE — ED ADULT NURSE NOTE - CHIEF COMPLAINT QUOTE
Biba from [facility] sent for low bp, was 70/40 in the clinic. 1L NS bolus, c/o generalized weakness. pt. moved to trauma. Biba from [facility] sent for low bp, was 70/40 in the clinic. 1L NS bolus, c/o generalized weakness. pt. moved to trauma.

## 2023-12-19 NOTE — ED ADULT NURSE REASSESSMENT NOTE - NS ED NURSE REASSESS COMMENT FT1
As per Dr. Guzman's order, pt port was de-accessed by nursing education. No complaints at this time, no acute distress noted. VS as charted, respirations equal and unlabored. Safety measures maintained, stretcher locked and in lowest position.

## 2023-12-19 NOTE — ED PROVIDER NOTE - OBJECTIVE STATEMENT
77 y/o F with a PMHx of urothelial carcinoma [place] [facility] [place]. The pt received a CT result showing potential PNA, and given bp of 70/40, was sent here to r/o sepsis. The person following the pt for possible PNA is someone named Jodi, but the pt was unable to recall the last name. Oncologist: Dr. Kaelyn Marvin. 77 y/o F with a PMHx of urothelial carcinoma [place] to the ED [place] [facility] [place]. The pt received a CT result showing potential PNA, and given bp of 70/40, was sent here to r/o sepsis. The person following the pt for possible PNA is someone named Jodi, but the pt was unable to recall the last name. Oncologist: Dr. Kaelyn Marvin. 79 y/o F with a PMHx of urothelial carcinoma [place] [facility] [place]. The pt received a CT result showing potential PNA, and given bp of 70/40, was sent here to r/o sepsis. The person following the pt for possible PNA is someone named Jodi, but the pt was unable to recall the last name. The pt is also endorsing decreased PO food and water, which are symptoms of her cancer. Oncologist: Dr. Kaelyn Marvin. 79 y/o F with a PMHx of urothelial carcinoma presents to the ED [place] [facility] [place]. The pt received a CT result showing potential PNA, and given bp of 70/40, was sent here to r/o sepsis. The person following the pt for possible PNA is someone named Jodi, but the pt was unable to recall the last name. The pt is also endorsing decreased PO food and water, which are symptoms of her cancer. Oncologist: Dr. Kaelyn Marvin.

## 2023-12-19 NOTE — ED ADULT NURSE NOTE - OBJECTIVE STATEMENT
Pt A&Ox4, presents to the ED from [facility] c/o weakness. Pt reports weakness and fatigue since starting treatment for UT/UC cancer. Pt diagnosed with cancer in April. Denies SOB or fevers. Pt noted to be pale. Pt has no other complaints. Pt reports falling out of bed last night due to weakness. Denies head strike. Oncologist Dr. Kaelyn Chung.

## 2023-12-19 NOTE — ED ADULT TRIAGE NOTE - CHIEF COMPLAINT QUOTE
[place] [facility] [place] was 70/40 in the clinic. 1L NS bolus, c/o generalized weakness. pt. moved to trauma. Biba [place] [facility] [place], was 70/40 in the clinic. 1L NS bolus, c/o generalized weakness. pt. moved to trauma.

## 2023-12-19 NOTE — ED ADULT NURSE REASSESSMENT NOTE - NS ED NURSE REASSESS COMMENT FT1
Received report from RN Kaelyn Dozier. Pt A&Ox4, resting comfortably. Pt denies any pain or discomfort. VS as charted, respirations equal and unlabored. Pending bed placement. Pt updated on plan of care, verbalized understanding. Safety measures maintained, stretcher locked and in lowest position, both side rails up. Received report from RN Kaelny Dozier. Pt A&Ox4, resting comfortably. Pt denies any pain or discomfort. VS as charted, respirations equal and unlabored. Pending bed placement. Pt updated on plan of care, verbalized understanding. Safety measures maintained, stretcher locked and in lowest position, both side rails up.

## 2023-12-20 DIAGNOSIS — I45.5 OTHER SPECIFIED HEART BLOCK: ICD-10-CM

## 2023-12-20 PROBLEM — C64.9 MALIGNANT NEOPLASM OF UNSPECIFIED KIDNEY, EXCEPT RENAL PELVIS: Chronic | Status: ACTIVE | Noted: 2023-05-01

## 2023-12-20 LAB
ALBUMIN SERPL ELPH-MCNC: 1.8 G/DL — LOW (ref 3.3–5)
ALBUMIN SERPL ELPH-MCNC: 1.8 G/DL — LOW (ref 3.3–5)
ALP SERPL-CCNC: 133 U/L — HIGH (ref 40–120)
ALP SERPL-CCNC: 133 U/L — HIGH (ref 40–120)
ALT FLD-CCNC: 14 U/L — SIGNIFICANT CHANGE UP (ref 12–78)
ALT FLD-CCNC: 14 U/L — SIGNIFICANT CHANGE UP (ref 12–78)
ANION GAP SERPL CALC-SCNC: 5 MMOL/L — SIGNIFICANT CHANGE UP (ref 5–17)
ANION GAP SERPL CALC-SCNC: 5 MMOL/L — SIGNIFICANT CHANGE UP (ref 5–17)
APTT BLD: 29.4 SEC — SIGNIFICANT CHANGE UP (ref 24.5–35.6)
APTT BLD: 29.4 SEC — SIGNIFICANT CHANGE UP (ref 24.5–35.6)
AST SERPL-CCNC: 18 U/L — SIGNIFICANT CHANGE UP (ref 15–37)
AST SERPL-CCNC: 18 U/L — SIGNIFICANT CHANGE UP (ref 15–37)
BASOPHILS # BLD AUTO: 0.04 K/UL — SIGNIFICANT CHANGE UP (ref 0–0.2)
BASOPHILS # BLD AUTO: 0.04 K/UL — SIGNIFICANT CHANGE UP (ref 0–0.2)
BASOPHILS NFR BLD AUTO: 0.3 % — SIGNIFICANT CHANGE UP (ref 0–2)
BASOPHILS NFR BLD AUTO: 0.3 % — SIGNIFICANT CHANGE UP (ref 0–2)
BILIRUB SERPL-MCNC: 0.3 MG/DL — SIGNIFICANT CHANGE UP (ref 0.2–1.2)
BILIRUB SERPL-MCNC: 0.3 MG/DL — SIGNIFICANT CHANGE UP (ref 0.2–1.2)
BUN SERPL-MCNC: 25 MG/DL — HIGH (ref 7–23)
BUN SERPL-MCNC: 25 MG/DL — HIGH (ref 7–23)
CALCIUM SERPL-MCNC: 7.7 MG/DL — LOW (ref 8.5–10.1)
CALCIUM SERPL-MCNC: 7.7 MG/DL — LOW (ref 8.5–10.1)
CHLORIDE SERPL-SCNC: 109 MMOL/L — HIGH (ref 96–108)
CHLORIDE SERPL-SCNC: 109 MMOL/L — HIGH (ref 96–108)
CHOLEST SERPL-MCNC: 121 MG/DL — SIGNIFICANT CHANGE UP
CHOLEST SERPL-MCNC: 121 MG/DL — SIGNIFICANT CHANGE UP
CO2 SERPL-SCNC: 23 MMOL/L — SIGNIFICANT CHANGE UP (ref 22–31)
CO2 SERPL-SCNC: 23 MMOL/L — SIGNIFICANT CHANGE UP (ref 22–31)
CREAT SERPL-MCNC: 1.41 MG/DL — HIGH (ref 0.5–1.3)
CREAT SERPL-MCNC: 1.41 MG/DL — HIGH (ref 0.5–1.3)
CULTURE RESULTS: SIGNIFICANT CHANGE UP
CULTURE RESULTS: SIGNIFICANT CHANGE UP
EGFR: 39 ML/MIN/1.73M2 — LOW
EGFR: 39 ML/MIN/1.73M2 — LOW
EOSINOPHIL # BLD AUTO: 0.06 K/UL — SIGNIFICANT CHANGE UP (ref 0–0.5)
EOSINOPHIL # BLD AUTO: 0.06 K/UL — SIGNIFICANT CHANGE UP (ref 0–0.5)
EOSINOPHIL NFR BLD AUTO: 0.4 % — SIGNIFICANT CHANGE UP (ref 0–6)
EOSINOPHIL NFR BLD AUTO: 0.4 % — SIGNIFICANT CHANGE UP (ref 0–6)
FERRITIN SERPL-MCNC: 598 NG/ML — HIGH (ref 13–330)
FERRITIN SERPL-MCNC: 598 NG/ML — HIGH (ref 13–330)
FOLATE SERPL-MCNC: 10.6 NG/ML — SIGNIFICANT CHANGE UP
FOLATE SERPL-MCNC: 10.6 NG/ML — SIGNIFICANT CHANGE UP
GLUCOSE SERPL-MCNC: 99 MG/DL — SIGNIFICANT CHANGE UP (ref 70–99)
GLUCOSE SERPL-MCNC: 99 MG/DL — SIGNIFICANT CHANGE UP (ref 70–99)
HCT VFR BLD CALC: 24.1 % — LOW (ref 34.5–45)
HCT VFR BLD CALC: 24.1 % — LOW (ref 34.5–45)
HCV AB S/CO SERPL IA: 0.08 S/CO — SIGNIFICANT CHANGE UP (ref 0–0.99)
HCV AB S/CO SERPL IA: 0.08 S/CO — SIGNIFICANT CHANGE UP (ref 0–0.99)
HCV AB SERPL-IMP: SIGNIFICANT CHANGE UP
HCV AB SERPL-IMP: SIGNIFICANT CHANGE UP
HDLC SERPL-MCNC: 50 MG/DL — LOW
HDLC SERPL-MCNC: 50 MG/DL — LOW
HGB BLD-MCNC: 7.5 G/DL — LOW (ref 11.5–15.5)
HGB BLD-MCNC: 7.5 G/DL — LOW (ref 11.5–15.5)
IMM GRANULOCYTES NFR BLD AUTO: 1.1 % — HIGH (ref 0–0.9)
IMM GRANULOCYTES NFR BLD AUTO: 1.1 % — HIGH (ref 0–0.9)
INR BLD: 1.08 RATIO — SIGNIFICANT CHANGE UP (ref 0.85–1.18)
INR BLD: 1.08 RATIO — SIGNIFICANT CHANGE UP (ref 0.85–1.18)
IRON SATN MFR SERPL: 11 UG/DL — LOW (ref 30–160)
IRON SATN MFR SERPL: 11 UG/DL — LOW (ref 30–160)
IRON SATN MFR SERPL: 6 % — LOW (ref 14–50)
IRON SATN MFR SERPL: 6 % — LOW (ref 14–50)
LIPID PNL WITH DIRECT LDL SERPL: 50 MG/DL — SIGNIFICANT CHANGE UP
LIPID PNL WITH DIRECT LDL SERPL: 50 MG/DL — SIGNIFICANT CHANGE UP
LYMPHOCYTES # BLD AUTO: 1.13 K/UL — SIGNIFICANT CHANGE UP (ref 1–3.3)
LYMPHOCYTES # BLD AUTO: 1.13 K/UL — SIGNIFICANT CHANGE UP (ref 1–3.3)
LYMPHOCYTES # BLD AUTO: 7.6 % — LOW (ref 13–44)
LYMPHOCYTES # BLD AUTO: 7.6 % — LOW (ref 13–44)
MCHC RBC-ENTMCNC: 29.4 PG — SIGNIFICANT CHANGE UP (ref 27–34)
MCHC RBC-ENTMCNC: 29.4 PG — SIGNIFICANT CHANGE UP (ref 27–34)
MCHC RBC-ENTMCNC: 31.1 GM/DL — LOW (ref 32–36)
MCHC RBC-ENTMCNC: 31.1 GM/DL — LOW (ref 32–36)
MCV RBC AUTO: 94.5 FL — SIGNIFICANT CHANGE UP (ref 80–100)
MCV RBC AUTO: 94.5 FL — SIGNIFICANT CHANGE UP (ref 80–100)
MONOCYTES # BLD AUTO: 1.27 K/UL — HIGH (ref 0–0.9)
MONOCYTES # BLD AUTO: 1.27 K/UL — HIGH (ref 0–0.9)
MONOCYTES NFR BLD AUTO: 8.6 % — SIGNIFICANT CHANGE UP (ref 2–14)
MONOCYTES NFR BLD AUTO: 8.6 % — SIGNIFICANT CHANGE UP (ref 2–14)
NEUTROPHILS # BLD AUTO: 12.15 K/UL — HIGH (ref 1.8–7.4)
NEUTROPHILS # BLD AUTO: 12.15 K/UL — HIGH (ref 1.8–7.4)
NEUTROPHILS NFR BLD AUTO: 82 % — HIGH (ref 43–77)
NEUTROPHILS NFR BLD AUTO: 82 % — HIGH (ref 43–77)
NON HDL CHOLESTEROL: 72 MG/DL — SIGNIFICANT CHANGE UP
NON HDL CHOLESTEROL: 72 MG/DL — SIGNIFICANT CHANGE UP
PLATELET # BLD AUTO: 175 K/UL — SIGNIFICANT CHANGE UP (ref 150–400)
PLATELET # BLD AUTO: 175 K/UL — SIGNIFICANT CHANGE UP (ref 150–400)
POTASSIUM SERPL-MCNC: 3.6 MMOL/L — SIGNIFICANT CHANGE UP (ref 3.5–5.3)
POTASSIUM SERPL-MCNC: 3.6 MMOL/L — SIGNIFICANT CHANGE UP (ref 3.5–5.3)
POTASSIUM SERPL-SCNC: 3.6 MMOL/L — SIGNIFICANT CHANGE UP (ref 3.5–5.3)
POTASSIUM SERPL-SCNC: 3.6 MMOL/L — SIGNIFICANT CHANGE UP (ref 3.5–5.3)
PROT SERPL-MCNC: 5.1 GM/DL — LOW (ref 6–8.3)
PROT SERPL-MCNC: 5.1 GM/DL — LOW (ref 6–8.3)
PROTHROM AB SERPL-ACNC: 12.2 SEC — SIGNIFICANT CHANGE UP (ref 9.5–13)
PROTHROM AB SERPL-ACNC: 12.2 SEC — SIGNIFICANT CHANGE UP (ref 9.5–13)
RBC # BLD: 2.55 M/UL — LOW (ref 3.8–5.2)
RBC # BLD: 2.55 M/UL — LOW (ref 3.8–5.2)
RBC # FLD: 15.9 % — HIGH (ref 10.3–14.5)
RBC # FLD: 15.9 % — HIGH (ref 10.3–14.5)
SODIUM SERPL-SCNC: 137 MMOL/L — SIGNIFICANT CHANGE UP (ref 135–145)
SODIUM SERPL-SCNC: 137 MMOL/L — SIGNIFICANT CHANGE UP (ref 135–145)
SPECIMEN SOURCE: SIGNIFICANT CHANGE UP
SPECIMEN SOURCE: SIGNIFICANT CHANGE UP
T3 SERPL-MCNC: 48 NG/DL — LOW (ref 80–200)
T3 SERPL-MCNC: 48 NG/DL — LOW (ref 80–200)
T4 AB SER-ACNC: 5 UG/DL — SIGNIFICANT CHANGE UP (ref 4.6–12)
T4 AB SER-ACNC: 5 UG/DL — SIGNIFICANT CHANGE UP (ref 4.6–12)
T4 FREE SERPL-MCNC: 0.95 NG/DL — SIGNIFICANT CHANGE UP (ref 0.76–1.46)
T4 FREE SERPL-MCNC: 0.95 NG/DL — SIGNIFICANT CHANGE UP (ref 0.76–1.46)
TIBC SERPL-MCNC: 175 UG/DL — LOW (ref 220–430)
TIBC SERPL-MCNC: 175 UG/DL — LOW (ref 220–430)
TRIGL SERPL-MCNC: 117 MG/DL — SIGNIFICANT CHANGE UP
TRIGL SERPL-MCNC: 117 MG/DL — SIGNIFICANT CHANGE UP
TSH SERPL-MCNC: 9.84 UU/ML — HIGH (ref 0.34–4.82)
TSH SERPL-MCNC: 9.84 UU/ML — HIGH (ref 0.34–4.82)
UIBC SERPL-MCNC: 164 UG/DL — SIGNIFICANT CHANGE UP (ref 110–370)
UIBC SERPL-MCNC: 164 UG/DL — SIGNIFICANT CHANGE UP (ref 110–370)
VIT B12 SERPL-MCNC: 425 PG/ML — SIGNIFICANT CHANGE UP (ref 232–1245)
VIT B12 SERPL-MCNC: 425 PG/ML — SIGNIFICANT CHANGE UP (ref 232–1245)
WBC # BLD: 14.81 K/UL — HIGH (ref 3.8–10.5)
WBC # BLD: 14.81 K/UL — HIGH (ref 3.8–10.5)
WBC # FLD AUTO: 14.81 K/UL — HIGH (ref 3.8–10.5)
WBC # FLD AUTO: 14.81 K/UL — HIGH (ref 3.8–10.5)

## 2023-12-20 PROCEDURE — 99223 1ST HOSP IP/OBS HIGH 75: CPT

## 2023-12-20 PROCEDURE — 99222 1ST HOSP IP/OBS MODERATE 55: CPT

## 2023-12-20 PROCEDURE — 71250 CT THORAX DX C-: CPT | Mod: 26

## 2023-12-20 PROCEDURE — 93306 TTE W/DOPPLER COMPLETE: CPT | Mod: 26

## 2023-12-20 PROCEDURE — 76770 US EXAM ABDO BACK WALL COMP: CPT | Mod: 26

## 2023-12-20 RX ORDER — CEFEPIME 1 G/1
2000 INJECTION, POWDER, FOR SOLUTION INTRAMUSCULAR; INTRAVENOUS
Refills: 0 | Status: DISCONTINUED | OUTPATIENT
Start: 2023-12-20 | End: 2023-12-22

## 2023-12-20 RX ORDER — OXYCODONE HYDROCHLORIDE 5 MG/1
2.5 TABLET ORAL ONCE
Refills: 0 | Status: DISCONTINUED | OUTPATIENT
Start: 2023-12-20 | End: 2023-12-20

## 2023-12-20 RX ORDER — LIDOCAINE 4 G/100G
1 CREAM TOPICAL ONCE
Refills: 0 | Status: COMPLETED | OUTPATIENT
Start: 2023-12-20 | End: 2023-12-20

## 2023-12-20 RX ORDER — OLANZAPINE 15 MG/1
1 TABLET, FILM COATED ORAL
Refills: 0 | DISCHARGE

## 2023-12-20 RX ORDER — SODIUM CHLORIDE 9 MG/ML
250 INJECTION INTRAMUSCULAR; INTRAVENOUS; SUBCUTANEOUS ONCE
Refills: 0 | Status: COMPLETED | OUTPATIENT
Start: 2023-12-20 | End: 2023-12-20

## 2023-12-20 RX ORDER — MULTIVIT-MIN/FERROUS GLUCONATE 9 MG/15 ML
1 LIQUID (ML) ORAL DAILY
Refills: 0 | Status: CANCELLED | OUTPATIENT
Start: 2023-12-20 | End: 2023-12-22

## 2023-12-20 RX ORDER — VANCOMYCIN HCL 1 G
500 VIAL (EA) INTRAVENOUS EVERY 24 HOURS
Refills: 0 | Status: DISCONTINUED | OUTPATIENT
Start: 2023-12-20 | End: 2023-12-20

## 2023-12-20 RX ORDER — MIDODRINE HYDROCHLORIDE 2.5 MG/1
20 TABLET ORAL ONCE
Refills: 0 | Status: COMPLETED | OUTPATIENT
Start: 2023-12-20 | End: 2023-12-20

## 2023-12-20 RX ORDER — VANCOMYCIN HCL 1 G
750 VIAL (EA) INTRAVENOUS
Refills: 0 | Status: DISCONTINUED | OUTPATIENT
Start: 2023-12-20 | End: 2023-12-20

## 2023-12-20 RX ORDER — HYDROCORTISONE 20 MG
125 TABLET ORAL ONCE
Refills: 0 | Status: COMPLETED | OUTPATIENT
Start: 2023-12-20 | End: 2023-12-20

## 2023-12-20 RX ORDER — PANTOPRAZOLE SODIUM 20 MG/1
40 TABLET, DELAYED RELEASE ORAL
Refills: 0 | Status: DISCONTINUED | OUTPATIENT
Start: 2023-12-20 | End: 2023-12-22

## 2023-12-20 RX ORDER — ACETAMINOPHEN 500 MG
650 TABLET ORAL EVERY 4 HOURS
Refills: 0 | Status: DISCONTINUED | OUTPATIENT
Start: 2023-12-20 | End: 2023-12-22

## 2023-12-20 RX ORDER — OXYCODONE HYDROCHLORIDE 5 MG/1
1 TABLET ORAL
Refills: 0 | DISCHARGE

## 2023-12-20 RX ORDER — HEPARIN SODIUM 5000 [USP'U]/ML
5000 INJECTION INTRAVENOUS; SUBCUTANEOUS EVERY 12 HOURS
Refills: 0 | Status: DISCONTINUED | OUTPATIENT
Start: 2023-12-20 | End: 2023-12-22

## 2023-12-20 RX ORDER — PANTOPRAZOLE SODIUM 20 MG/1
1 TABLET, DELAYED RELEASE ORAL
Refills: 0 | DISCHARGE

## 2023-12-20 RX ORDER — SODIUM CHLORIDE 9 MG/ML
1000 INJECTION INTRAMUSCULAR; INTRAVENOUS; SUBCUTANEOUS ONCE
Refills: 0 | Status: COMPLETED | OUTPATIENT
Start: 2023-12-20 | End: 2023-12-20

## 2023-12-20 RX ORDER — LEVOTHYROXINE SODIUM 125 MCG
125 TABLET ORAL DAILY
Refills: 0 | Status: DISCONTINUED | OUTPATIENT
Start: 2023-12-20 | End: 2023-12-22

## 2023-12-20 RX ORDER — ASPIRIN/CALCIUM CARB/MAGNESIUM 324 MG
81 TABLET ORAL DAILY
Refills: 0 | Status: DISCONTINUED | OUTPATIENT
Start: 2023-12-20 | End: 2023-12-22

## 2023-12-20 RX ORDER — THIAMINE MONONITRATE (VIT B1) 100 MG
100 TABLET ORAL DAILY
Refills: 0 | Status: CANCELLED | OUTPATIENT
Start: 2023-12-20 | End: 2023-12-22

## 2023-12-20 RX ORDER — CEFEPIME 1 G/1
1000 INJECTION, POWDER, FOR SOLUTION INTRAMUSCULAR; INTRAVENOUS EVERY 24 HOURS
Refills: 0 | Status: DISCONTINUED | OUTPATIENT
Start: 2023-12-20 | End: 2023-12-20

## 2023-12-20 RX ORDER — OXYCODONE HYDROCHLORIDE 5 MG/1
2 TABLET ORAL
Refills: 0 | DISCHARGE

## 2023-12-20 RX ORDER — MIDODRINE HYDROCHLORIDE 2.5 MG/1
10 TABLET ORAL ONCE
Refills: 0 | Status: COMPLETED | OUTPATIENT
Start: 2023-12-20 | End: 2023-12-20

## 2023-12-20 RX ADMIN — CEFEPIME 2000 MILLIGRAM(S): 1 INJECTION, POWDER, FOR SOLUTION INTRAMUSCULAR; INTRAVENOUS at 21:58

## 2023-12-20 RX ADMIN — LIDOCAINE 1 PATCH: 4 CREAM TOPICAL at 21:58

## 2023-12-20 RX ADMIN — LIDOCAINE 1 PATCH: 4 CREAM TOPICAL at 00:37

## 2023-12-20 RX ADMIN — MIDODRINE HYDROCHLORIDE 10 MILLIGRAM(S): 2.5 TABLET ORAL at 00:55

## 2023-12-20 RX ADMIN — SODIUM CHLORIDE 250 MILLILITER(S): 9 INJECTION INTRAMUSCULAR; INTRAVENOUS; SUBCUTANEOUS at 02:20

## 2023-12-20 RX ADMIN — MIDODRINE HYDROCHLORIDE 20 MILLIGRAM(S): 2.5 TABLET ORAL at 10:26

## 2023-12-20 RX ADMIN — Medication 125 MICROGRAM(S): at 10:07

## 2023-12-20 RX ADMIN — LIDOCAINE 1 PATCH: 4 CREAM TOPICAL at 11:30

## 2023-12-20 RX ADMIN — HEPARIN SODIUM 5000 UNIT(S): 5000 INJECTION INTRAVENOUS; SUBCUTANEOUS at 22:00

## 2023-12-20 RX ADMIN — HEPARIN SODIUM 5000 UNIT(S): 5000 INJECTION INTRAVENOUS; SUBCUTANEOUS at 15:10

## 2023-12-20 RX ADMIN — PANTOPRAZOLE SODIUM 40 MILLIGRAM(S): 20 TABLET, DELAYED RELEASE ORAL at 06:13

## 2023-12-20 RX ADMIN — Medication 100 MILLIGRAM(S): at 21:58

## 2023-12-20 RX ADMIN — OXYCODONE HYDROCHLORIDE 2.5 MILLIGRAM(S): 5 TABLET ORAL at 22:45

## 2023-12-20 RX ADMIN — Medication 81 MILLIGRAM(S): at 15:10

## 2023-12-20 RX ADMIN — Medication 5 MILLIGRAM(S): at 15:10

## 2023-12-20 RX ADMIN — OXYCODONE HYDROCHLORIDE 2.5 MILLIGRAM(S): 5 TABLET ORAL at 00:37

## 2023-12-20 RX ADMIN — LIDOCAINE 1 PATCH: 4 CREAM TOPICAL at 09:29

## 2023-12-20 RX ADMIN — Medication 125 MILLIGRAM(S): at 18:11

## 2023-12-20 RX ADMIN — OXYCODONE HYDROCHLORIDE 2.5 MILLIGRAM(S): 5 TABLET ORAL at 01:30

## 2023-12-20 RX ADMIN — SODIUM CHLORIDE 1000 MILLILITER(S): 9 INJECTION INTRAMUSCULAR; INTRAVENOUS; SUBCUTANEOUS at 10:26

## 2023-12-20 RX ADMIN — OXYCODONE HYDROCHLORIDE 2.5 MILLIGRAM(S): 5 TABLET ORAL at 21:58

## 2023-12-20 RX ADMIN — Medication 650 MILLIGRAM(S): at 06:42

## 2023-12-20 RX ADMIN — SODIUM CHLORIDE 250 MILLILITER(S): 9 INJECTION INTRAMUSCULAR; INTRAVENOUS; SUBCUTANEOUS at 04:21

## 2023-12-20 NOTE — CONSULT NOTE ADULT - ASSESSMENT
79 y/o F w/ PMH of urothelial carcinoma w/ mets (on chemo???), CVA, osteoarthritis, IBS, hypothyroidism. 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 Patient was at [facility] and found to have BP of 70/40 and was also told she had pneumonia on CT scan and was referred to ED for further evaluation. Patient denies cough, runny nose, sore throat, fever, chills. Her only complaint is back pain 2/2 malignancy. Imaging remarkable for New biapical opacities. Question overlying soft tissues, pleural thickening, or apical caps of pleural fluid. New patchy left mid and lower lung opacities, of indeterminate nature, but which may be due to pneumonia. Started on IV abx - vancomycin/cefepime.     1. Hypotension. Multifocal pneumonia. Metastatic urothelial cancer on immunotherapy. Immunocompromised host  - imaging reviewed  - agree with cefepime [id number]   - dc vancomycin, change to po doxycycline 100mg BID   - continue with abx coverage  - f/u cultures  - monitor temps  - tolerating abx well so far; no side effects noted  - reason for abx use and side effects reviewed with patient  - oncology f/u noted  - supportive care  - fu cbc    2. other issues - care per medicine  77 y/o F w/ PMH of urothelial carcinoma w/ mets (on chemo???), CVA, osteoarthritis, IBS, hypothyroidism. 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 [place] [facility] [place] found to have BP of 70/40 and was also told she had pneumonia on CT scan and was referred to ED for further evaluation. Patient denies cough, runny nose, sore throat, fever, chills. Her only complaint is back pain 2/2 malignancy. Imaging remarkable for New biapical opacities. Question overlying soft tissues, pleural thickening, or apical caps of pleural fluid. New patchy left mid and lower lung opacities, of indeterminate nature, but which may be due to pneumonia. Started on IV abx - vancomycin/cefepime.     1. Hypotension. Multifocal pneumonia. Metastatic urothelial cancer on immunotherapy. Immunocompromised host  - imaging reviewed  - agree with cefepime [id number]   - dc vancomycin, change to po doxycycline 100mg BID   - continue with abx coverage  - f/u cultures  - monitor temps  - tolerating abx well so far; no side effects noted  - reason for abx use and side effects reviewed with patient  - oncology f/u noted  - supportive care  - fu cbc    2. other issues - care per medicine

## 2023-12-20 NOTE — DIETITIAN INITIAL EVALUATION ADULT - PERTINENT LABORATORY DATA
12-20    137  |  109<H>  |  25<H>  ----------------------------<  99  3.6   |  23  |  1.41<H>    Ca    7.7<L>      20 Dec 2023 07:11    TPro  5.1<L>  /  Alb  1.8<L>  /  TBili  0.3  /  DBili  x   /  AST  18  /  ALT  14  /  AlkPhos  133<H>  12-20    Iron Total: 11 ug/dL (12-20-23 @ 07:11)

## 2023-12-20 NOTE — CONSULT NOTE ADULT - SUBJECTIVE AND OBJECTIVE BOX
Patient is a 78y old  Female who presents with a chief complaint of hypotension (20 Dec 2023 10:31)    HPI:  79 y/o F w/ PMH of urothelial carcinoma w/ mets (on chemo???), CVA, osteoarthritis, IBS, hypothyroidism. 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 Patient was at [facility] [place] and found to have BP of 70/40 and was also told she had pneumonia on CT scan and was referred to ED for further evaluation. Patient denies cough, runny nose, sore throat, fever, chills. Her only complaint is back pain 2/2 malignancy.       PSH: Cholecystectomy     Social Hx: Former smoker 35 years ago (1ppd x 20 years), etoh / drugs - Denies     Family Hx: Brother - lung cancer  (20 Dec 2023 02:40)      PMH: as above  PSH: as above  Meds: per reconciliation sheet, noted below  MEDICATIONS  (STANDING):  aspirin enteric coated 81 milliGRAM(s) Oral daily  cefepime  Injectable. 2000 milliGRAM(s) IV Push <User Schedule>  heparin   Injectable 5000 Unit(s) SubCutaneous every 12 hours  hydrocortisone sodium succinate Injectable 125 milliGRAM(s) IV Push once  levothyroxine 125 MICROGram(s) Oral daily  pantoprazole    Tablet 40 milliGRAM(s) Oral before breakfast  predniSONE   Tablet 5 milliGRAM(s) Oral daily  vancomycin  IVPB 750 milliGRAM(s) IV Intermittent <User Schedule>    MEDICATIONS  (PRN):  acetaminophen     Tablet .. 650 milliGRAM(s) Oral every 4 hours PRN Mild Pain (1 - 3)    Allergies    No Known Allergies    Intolerances      Social: no smoking, no alcohol, no illegal drugs; no recent travel, no exposure to TB  FAMILY HISTORY:     no history of premature cardiovascular disease in first degree relatives    ROS: the patient denies fever, no chills, no HA, no dizziness, no sore throat, no blurry vision, no CP, no palpitations, no SOB, no cough, no abdominal pain, no diarrhea, no N/V, no dysuria, no leg pain, no claudication, no rash, no joint aches, no rectal pain or bleeding, no night sweats    All other systems reviewed and are negative    Vital Signs Last 24 Hrs  T(C): 36.6 (20 Dec 2023 07:36), Max: 37.3 (19 Dec 2023 19:23)  T(F): 97.9 (20 Dec 2023 07:36), Max: 99.1 (19 Dec 2023 19:23)  HR: 73 (20 Dec 2023 07:36) (73 - 96)  BP: 83/49 (20 Dec 2023 07:36) (80/65 - 110/95)  BP(mean): 71 (20 Dec 2023 06:15) (64 - 80)  RR: 16 (20 Dec 2023 07:36) (12 - 22)  SpO2: 92% (20 Dec 2023 07:36) (90% - 98%)    Parameters below as of 20 Dec 2023 07:36  Patient On (Oxygen Delivery Method): room air      Daily     Daily Weight in k.6 (20 Dec 2023 06:45)    PE:  Constitutional: frail looking  HEENT: NC/AT, EOMI, PERRLA, conjunctivae clear; ears and nose atraumatic; pharynx benign  Neck: supple; thyroid not palpable  Back: no tenderness  Respiratory: respiratory effort normal; clear to auscultation  Cardiovascular: S1S2 regular, no murmurs  Abdomen: soft, not tender, not distended, positive BS; liver and spleen WNL  Genitourinary: no suprapubic tenderness  Lymphatic: no LN palpable  Musculoskeletal: no muscle tenderness, no joint swelling or tenderness  Extremities: no pedal edema  Neurological/ Psychiatric: AxOx3, Judgement and insight normal;  moving all extremities  Skin: no rashes; no palpable lesions    Labs: all available labs reviewed                        7.5    14.81 )-----------( 175      ( 20 Dec 2023 07:11 )             24.1     12-20    137  |  109<H>  |  25<H>  ----------------------------<  99  3.6   |  23  |  1.41<H>    Ca    7.7<L>      20 Dec 2023 07:11    TPro  5.1<L>  /  Alb  1.8<L>  /  TBili  0.3  /  DBili  x   /  AST  18  /  ALT  14  /  AlkPhos  133<H>  12-20     LIVER FUNCTIONS - ( 20 Dec 2023 07:11 )  Alb: 1.8 g/dL / Pro: 5.1 gm/dL / ALK PHOS: 133 U/L / ALT: 14 U/L / AST: 18 U/L / GGT: x           Urinalysis Basic - ( 20 Dec 2023 07:11 )    Color: x / Appearance: x / SG: x / pH: x  Gluc: 99 mg/dL / Ketone: x  / Bili: x / Urobili: x   Blood: x / Protein: x / Nitrite: x   Leuk Esterase: x / RBC: x / WBC x   Sq Epi: x / Non Sq Epi: x / Bacteria: x          Radiology: all available radiological tests reviewed    < from: Xray Chest 1 View-PORTABLE IMMEDIATE (23 @ 19:09) >  ACC: [id number] EXAM:  XR CHEST PORTABLE IMMED 1V   ORDERED BY: MARIUM CORRALES     PROCEDURE DATE:  2023          INTERPRETATION:  TIME OF EXAM: 2023 at 6:59 PM.    CLINICAL INFORMATION: Sepsis.    COMPARISON:  2023.    TECHNIQUE:   AP Portable chest x-ray.    INTERPRETATION:    Heart size is not accurately evaluated on this image.  There is an accessed CT injectable right IJ approach port with tip in the   SVC.  The thoracic aorta is calcified.  There is new biapical opacities. Question overlying soft tissues, pleural   thickening, or apical caps of pleural fluid.  There are new patchy left mid and lower lung opacities.  There is no right pleural effusion.  There is a trace left pleural effusion.  No pneumothorax is seen.  There is scoliosis of the spine with osteoarthritic degenerative change.      IMPRESSION:  New biapical opacities. Question overlying soft tissues,   pleural thickening, or apical caps of pleural fluid.    New patchy left mid and lower lung opacities, of indeterminate nature,   but which may be due to pneumonia. Suggest short-term follow-up chest   x-ray to evaluate for resolution and exclude other possible etiologies.    --- End of Report ---        < end of copied text >    Advanced directives addressed: full resuscitation Patient is a 78y old  Female who presents with a chief complaint of hypotension (20 Dec 2023 10:31)    HPI:  79 y/o F w/ PMH of urothelial carcinoma w/ mets (on chemo???), CVA, osteoarthritis, IBS, hypothyroidism. 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 Patient was at [facility] and found to have BP of 70/40 and was also told she had pneumonia on CT scan and was referred to ED for further evaluation. Patient denies cough, runny nose, sore throat, fever, chills. Her only complaint is back pain 2/2 malignancy.       PSH: Cholecystectomy     Social Hx: Former smoker 35 years ago (1ppd x 20 years), etoh / drugs - Denies     Family Hx: Brother - lung cancer  (20 Dec 2023 02:40)      PMH: as above  PSH: as above  Meds: per reconciliation sheet, noted below  MEDICATIONS  (STANDING):  aspirin enteric coated 81 milliGRAM(s) Oral daily  cefepime  Injectable. 2000 milliGRAM(s) IV Push <User Schedule>  heparin   Injectable 5000 Unit(s) SubCutaneous every 12 hours  hydrocortisone sodium succinate Injectable 125 milliGRAM(s) IV Push once  levothyroxine 125 MICROGram(s) Oral daily  pantoprazole    Tablet 40 milliGRAM(s) Oral before breakfast  predniSONE   Tablet 5 milliGRAM(s) Oral daily  vancomycin  IVPB 750 milliGRAM(s) IV Intermittent <User Schedule>    MEDICATIONS  (PRN):  acetaminophen     Tablet .. 650 milliGRAM(s) Oral every 4 hours PRN Mild Pain (1 - 3)    Allergies    No Known Allergies    Intolerances      Social: no smoking, no alcohol, no illegal drugs; no recent travel, no exposure to TB  FAMILY HISTORY:     no history of premature cardiovascular disease in first degree relatives    ROS: the patient denies fever, no chills, no HA, no dizziness, no sore throat, no blurry vision, no CP, no palpitations, no SOB, no cough, no abdominal pain, no diarrhea, no N/V, no dysuria, no leg pain, no claudication, no rash, no joint aches, no rectal pain or bleeding, no night sweats    All other systems reviewed and are negative    Vital Signs Last 24 Hrs  T(C): 36.6 (20 Dec 2023 07:36), Max: 37.3 (19 Dec 2023 19:23)  T(F): 97.9 (20 Dec 2023 07:36), Max: 99.1 (19 Dec 2023 19:23)  HR: 73 (20 Dec 2023 07:36) (73 - 96)  BP: 83/49 (20 Dec 2023 07:36) (80/65 - 110/95)  BP(mean): 71 (20 Dec 2023 06:15) (64 - 80)  RR: 16 (20 Dec 2023 07:36) (12 - 22)  SpO2: 92% (20 Dec 2023 07:36) (90% - 98%)    Parameters below as of 20 Dec 2023 07:36  Patient On (Oxygen Delivery Method): room air      Daily     Daily Weight in k.6 (20 Dec 2023 06:45)    PE:  Constitutional: frail looking  HEENT: NC/AT, EOMI, PERRLA, conjunctivae clear; ears and nose atraumatic; pharynx benign  Neck: supple; thyroid not palpable  Back: no tenderness  Respiratory: respiratory effort normal; clear to auscultation  Cardiovascular: S1S2 regular, no murmurs  Abdomen: soft, not tender, not distended, positive BS; liver and spleen WNL  Genitourinary: no suprapubic tenderness  Lymphatic: no LN palpable  Musculoskeletal: no muscle tenderness, no joint swelling or tenderness  Extremities: no pedal edema  Neurological/ Psychiatric: AxOx3, Judgement and insight normal;  moving all extremities  Skin: no rashes; no palpable lesions    Labs: all available labs reviewed                        7.5    14.81 )-----------( 175      ( 20 Dec 2023 07:11 )             24.1     12-20    137  |  109<H>  |  25<H>  ----------------------------<  99  3.6   |  23  |  1.41<H>    Ca    7.7<L>      20 Dec 2023 07:11    TPro  5.1<L>  /  Alb  1.8<L>  /  TBili  0.3  /  DBili  x   /  AST  18  /  ALT  14  /  AlkPhos  133<H>  12-20     LIVER FUNCTIONS - ( 20 Dec 2023 07:11 )  Alb: 1.8 g/dL / Pro: 5.1 gm/dL / ALK PHOS: 133 U/L / ALT: 14 U/L / AST: 18 U/L / GGT: x           Urinalysis Basic - ( 20 Dec 2023 07:11 )    Color: x / Appearance: x / SG: x / pH: x  Gluc: 99 mg/dL / Ketone: x  / Bili: x / Urobili: x   Blood: x / Protein: x / Nitrite: x   Leuk Esterase: x / RBC: x / WBC x   Sq Epi: x / Non Sq Epi: x / Bacteria: x          Radiology: all available radiological tests reviewed    < from: Xray Chest 1 View-PORTABLE IMMEDIATE (23 @ 19:09) >  ACC: [id number] EXAM:  XR CHEST PORTABLE IMMED 1V   ORDERED BY: MARIUM CORRALES     PROCEDURE DATE:  2023          INTERPRETATION:  TIME OF EXAM: 2023 at 6:59 PM.    CLINICAL INFORMATION: Sepsis.    COMPARISON:  2023.    TECHNIQUE:   AP Portable chest x-ray.    INTERPRETATION:    Heart size is not accurately evaluated on this image.  There is an accessed CT injectable right IJ approach port with tip in the   SVC.  The thoracic aorta is calcified.  There is new biapical opacities. Question overlying soft tissues, pleural   thickening, or apical caps of pleural fluid.  There are new patchy left mid and lower lung opacities.  There is no right pleural effusion.  There is a trace left pleural effusion.  No pneumothorax is seen.  There is scoliosis of the spine with osteoarthritic degenerative change.      IMPRESSION:  New biapical opacities. Question overlying soft tissues,   pleural thickening, or apical caps of pleural fluid.    New patchy left mid and lower lung opacities, of indeterminate nature,   but which may be due to pneumonia. Suggest short-term follow-up chest   x-ray to evaluate for resolution and exclude other possible etiologies.    --- End of Report ---        < end of copied text >    Advanced directives addressed: full resuscitation Patient is a 78y old  Female who presents with a chief complaint of hypotension (20 Dec 2023 10:31)    HPI:  79 y/o F w/ PMH of urothelial carcinoma w/ mets (on chemo???), CVA, osteoarthritis, IBS, hypothyroidism. 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 [place] [facility] [place] of 70[place]40 and was also told she had pneumonia on CT scan and was referred to ED for further evaluation. Patient denies cough, runny nose, sore throat, fever, chills. Her only complaint is back pain 2/2 malignancy. Imaging remarkable for New biapical opacities. Question overlying soft tissues, pleural thickening, or apical caps of pleural fluid. New patchy left mid and lower lung opacities, of indeterminate nature, but which may be due to pneumonia. Started on IV abx - vancomycin/cefepime.     PSH: Cholecystectomy     Social Hx: Former smoker 35 years ago (1ppd x 20 years), etoh / drugs - Denies     Family Hx: Brother - lung cancer  (20 Dec 2023 02:40)      PMH: as above  PSH: as above  Meds: per reconciliation sheet, noted below  MEDICATIONS  (STANDING):  aspirin enteric coated 81 milliGRAM(s) Oral daily  cefepime  Injectable. 2000 milliGRAM(s) IV Push <User Schedule>  heparin   Injectable 5000 Unit(s) SubCutaneous every 12 hours  hydrocortisone sodium succinate Injectable 125 milliGRAM(s) IV Push once  levothyroxine 125 MICROGram(s) Oral daily  pantoprazole    Tablet 40 milliGRAM(s) Oral before breakfast  predniSONE   Tablet 5 milliGRAM(s) Oral daily  vancomycin  IVPB 750 milliGRAM(s) IV Intermittent <User Schedule>      Allergies    No Known Allergies    Intolerances      Social: no smoking, no alcohol, no illegal drugs; no recent travel, no exposure to TB  FAMILY HISTORY:     no history of premature cardiovascular disease in first degree relatives    ROS: the patient denies fever, no chills, no HA, no dizziness, no sore throat, no blurry vision, no CP, no palpitations, no sob, +cough, no abdominal pain, no diarrhea, no N/V, no dysuria, no leg pain, no claudication, no rash, no joint aches, no rectal pain or bleeding, no night sweats    All other systems reviewed and are negative    Vital Signs Last 24 Hrs  T(C): 36.6 (20 Dec 2023 07:36), Max: 37.3 (19 Dec 2023 19:23)  T(F): 97.9 (20 Dec 2023 07:36), Max: 99.1 (19 Dec 2023 19:23)  HR: 73 (20 Dec 2023 07:36) (73 - 96)  BP: 83/49 (20 Dec 2023 07:36) (80/65 - 110/95)  BP(mean): 71 (20 Dec 2023 06:15) (64 - 80)  RR: 16 (20 Dec 2023 07:36) (12 - 22)  SpO2: 92% (20 Dec 2023 07:36) (90% - 98%)    Parameters below as of 20 Dec 2023 07:36  Patient On (Oxygen Delivery Method): room air      Daily     Daily Weight in k.6 (20 Dec 2023 06:45)    PE:  Constitutional: NAD   HEENT: NC/AT, EOMI, PERRLA, conjunctivae clear; ears and nose atraumatic; pharynx benign  Neck: supple; thyroid not palpable  Back: no tenderness  Respiratory: decreased breath sounds  Cardiovascular: S1S2 regular, no murmurs  Abdomen: soft, not tender, not distended, positive BS; liver and spleen WNL  Genitourinary: no suprapubic tenderness  Lymphatic: no LN palpable  Musculoskeletal: no muscle tenderness, no joint swelling or tenderness  Extremities: no pedal edema  Neurological/ Psychiatric: AxOx3, Judgement and insight normal;  moving all extremities  Skin: no rashes; no palpable lesions    Labs: all available labs reviewed                        7.5    14.81 )-----------( 175      ( 20 Dec 2023 07:11 )             24.1     12-20    137  |  109<H>  |  25<H>  ----------------------------<  99  3.6   |  23  |  1.41<H>    Ca    7.7<L>      20 Dec 2023 07:11    TPro  5.1<L>  /  Alb  1.8<L>  /  TBili  0.3  /  DBili  x   /  AST  18  /  ALT  14  /  AlkPhos  133<H>  12-20     LIVER FUNCTIONS - ( 20 Dec 2023 07:11 )  Alb: 1.8 g/dL / Pro: 5.1 gm/dL / ALK PHOS: 133 U/L / ALT: 14 U/L / AST: 18 U/L / GGT: x           Urinalysis Basic - ( 20 Dec 2023 07:11 )    Color: x / Appearance: x / SG: x / pH: x  Gluc: 99 mg/dL / Ketone: x  / Bili: x / Urobili: x   Blood: x / Protein: x / Nitrite: x   Leuk Esterase: x / RBC: x / WBC x   Sq Epi: x / Non Sq Epi: x / Bacteria: x          Radiology: all available radiological tests reviewed    < from: Xray Chest 1 View-PORTABLE IMMEDIATE (23 @ 19:09) >  ACC: [id number] EXAM:  XR CHEST PORTABLE IMMED 1V   ORDERED BY: MARIUM CORRALES     PROCEDURE DATE:  2023          INTERPRETATION:  TIME OF EXAM: 2023 at 6:59 PM.    CLINICAL INFORMATION: Sepsis.    COMPARISON:  2023.    TECHNIQUE:   AP Portable chest x-ray.    INTERPRETATION:    Heart size is not accurately evaluated on this image.  There is an accessed CT injectable right IJ approach port with tip in the   SVC.  The thoracic aorta is calcified.  There is new biapical opacities. Question overlying soft tissues, pleural   thickening, or apical caps of pleural fluid.  There are new patchy left mid and lower lung opacities.  There is no right pleural effusion.  There is a trace left pleural effusion.  No pneumothorax is seen.  There is scoliosis of the spine with osteoarthritic degenerative change.      IMPRESSION:  New biapical opacities. Question overlying soft tissues,   pleural thickening, or apical caps of pleural fluid.    New patchy left mid and lower lung opacities, of indeterminate nature,   but which may be due to pneumonia. Suggest short-term follow-up chest   x-ray to evaluate for resolution and exclude other possible etiologies.      Advanced directives addressed: full resuscitation Patient is a 78y old  Female who presents with a chief complaint of hypotension (20 Dec 2023 10:31)    HPI:  77 y/o F w/ PMH of urothelial carcinoma w/ mets (on chemo???), CVA, osteoarthritis, IBS, hypothyroidism. 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 Patient was [place] [facility] [place] found to have BP of 70/40 and was also told she had pneumonia on CT scan and was referred to ED for further evaluation. Patient denies cough, runny nose, sore throat, fever, chills. Her only complaint is back pain 2/2 malignancy. Imaging remarkable for New biapical opacities. Question overlying soft tissues, pleural thickening, or apical caps of pleural fluid. New patchy left mid and lower lung opacities, of indeterminate nature, but which may be due to pneumonia. Started on IV abx - vancomycin/cefepime.     PSH: Cholecystectomy     Social Hx: Former smoker 35 years ago (1ppd x 20 years), etoh / drugs - Denies     Family Hx: Brother - lung cancer  (20 Dec 2023 02:40)      PMH: as above  PSH: as above  Meds: per reconciliation sheet, noted below  MEDICATIONS  (STANDING):  aspirin enteric coated 81 milliGRAM(s) Oral daily  cefepime  Injectable. 2000 milliGRAM(s) IV Push <User Schedule>  heparin   Injectable 5000 Unit(s) SubCutaneous every 12 hours  hydrocortisone sodium succinate Injectable 125 milliGRAM(s) IV Push once  levothyroxine 125 MICROGram(s) Oral daily  pantoprazole    Tablet 40 milliGRAM(s) Oral before breakfast  predniSONE   Tablet 5 milliGRAM(s) Oral daily  vancomycin  IVPB 750 milliGRAM(s) IV Intermittent <User Schedule>      Allergies    No Known Allergies    Intolerances      Social: no smoking, no alcohol, no illegal drugs; no recent travel, no exposure to TB  FAMILY HISTORY:     no history of premature cardiovascular disease in first degree relatives    ROS: the patient denies fever, no chills, no HA, no dizziness, no sore throat, no blurry vision, no CP, no palpitations, no sob, +cough, no abdominal pain, no diarrhea, no N/V, no dysuria, no leg pain, no claudication, no rash, no joint aches, no rectal pain or bleeding, no night sweats    All other systems reviewed and are negative    Vital Signs Last 24 Hrs  T(C): 36.6 (20 Dec 2023 07:36), Max: 37.3 (19 Dec 2023 19:23)  T(F): 97.9 (20 Dec 2023 07:36), Max: 99.1 (19 Dec 2023 19:23)  HR: 73 (20 Dec 2023 07:36) (73 - 96)  BP: 83/49 (20 Dec 2023 07:36) (80/65 - 110/95)  BP(mean): 71 (20 Dec 2023 06:15) (64 - 80)  RR: 16 (20 Dec 2023 07:36) (12 - 22)  SpO2: 92% (20 Dec 2023 07:36) (90% - 98%)    Parameters below as of 20 Dec 2023 07:36  Patient On (Oxygen Delivery Method): room air      Daily     Daily Weight in k.6 (20 Dec 2023 06:45)    PE:  Constitutional: NAD   HEENT: NC/AT, EOMI, PERRLA, conjunctivae clear; ears and nose atraumatic; pharynx benign  Neck: supple; thyroid not palpable  Back: no tenderness  Respiratory: decreased breath sounds  Cardiovascular: S1S2 regular, no murmurs  Abdomen: soft, not tender, not distended, positive BS; liver and spleen WNL  Genitourinary: no suprapubic tenderness  Lymphatic: no LN palpable  Musculoskeletal: no muscle tenderness, no joint swelling or tenderness  Extremities: no pedal edema  Neurological/ Psychiatric: AxOx3, Judgement and insight normal;  moving all extremities  Skin: no rashes; no palpable lesions    Labs: all available labs reviewed                        7.5    14.81 )-----------( 175      ( 20 Dec 2023 07:11 )             24.1     12-20    137  |  109<H>  |  25<H>  ----------------------------<  99  3.6   |  23  |  1.41<H>    Ca    7.7<L>      20 Dec 2023 07:11    TPro  5.1<L>  /  Alb  1.8<L>  /  TBili  0.3  /  DBili  x   /  AST  18  /  ALT  14  /  AlkPhos  133<H>  12-20     LIVER FUNCTIONS - ( 20 Dec 2023 07:11 )  Alb: 1.8 g/dL / Pro: 5.1 gm/dL / ALK PHOS: 133 U/L / ALT: 14 U/L / AST: 18 U/L / GGT: x           Urinalysis Basic - ( 20 Dec 2023 07:11 )    Color: x / Appearance: x / SG: x / pH: x  Gluc: 99 mg/dL / Ketone: x  / Bili: x / Urobili: x   Blood: x / Protein: x / Nitrite: x   Leuk Esterase: x / RBC: x / WBC x   Sq Epi: x / Non Sq Epi: x / Bacteria: x          Radiology: all available radiological tests reviewed    < from: Xray Chest 1 View-PORTABLE IMMEDIATE (23 @ 19:09) >  ACC: [id number] EXAM:  XR CHEST PORTABLE IMMED 1V   ORDERED BY: MARIUM CORRALES     PROCEDURE DATE:  2023          INTERPRETATION:  TIME OF EXAM: 2023 at 6:59 PM.    CLINICAL INFORMATION: Sepsis.    COMPARISON:  2023.    TECHNIQUE:   AP Portable chest x-ray.    INTERPRETATION:    Heart size is not accurately evaluated on this image.  There is an accessed CT injectable right IJ approach port with tip in the   SVC.  The thoracic aorta is calcified.  There is new biapical opacities. Question overlying soft tissues, pleural   thickening, or apical caps of pleural fluid.  There are new patchy left mid and lower lung opacities.  There is no right pleural effusion.  There is a trace left pleural effusion.  No pneumothorax is seen.  There is scoliosis of the spine with osteoarthritic degenerative change.      IMPRESSION:  New biapical opacities. Question overlying soft tissues,   pleural thickening, or apical caps of pleural fluid.    New patchy left mid and lower lung opacities, of indeterminate nature,   but which may be due to pneumonia. Suggest short-term follow-up chest   x-ray to evaluate for resolution and exclude other possible etiologies.      Advanced directives addressed: full resuscitation

## 2023-12-20 NOTE — DIETITIAN INITIAL EVALUATION ADULT - OTHER INFO
77 y/o F w/ PMH of urothelial carcinoma w/ mets (on chemo???), CVA, osteoarthritis, IBS, hypothyroidism. 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 Patient was at [facility] [place] and found to have BP of 70/40 and was also told she had pneumonia on CT scan and was referred to ED for further evaluation. Patient denies cough, runny nose, sore throat, fever, chills. Her only complaint is back pain 2/2 malignancy. Adm w/ septic shock 2/2 HCAP.     Pt reports poor appetite but is trying to eat as much as she can - RD observed pt consumed 50% bkfst this am 12/20/23. Endorses UBW of 125# that has decreased to 97# since April - 22.4% x 8 mo (severe, clinsig); bed scale wt of 100.44# obtained by RD on 12/20/23. NFPE reveals moderate-severe muscle/fat wasting - appears frail, thin, bony. Recommend [employer] BID to optimize nutritional needs (provides 325 kcal, 16 g protein/ shake) 2/2 IBS. C/w Regular diet to optimize nutrient and caloric intake. See below for further recommendations.  79 y/o F w/ PMH of urothelial carcinoma w/ mets (on chemo???), CVA, osteoarthritis, IBS, hypothyroidism. 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 Patient was at [facility] [place] and found to have BP of 70/40 and was also told she had pneumonia on CT scan and was referred to ED for further evaluation. Patient denies cough, runny nose, sore throat, fever, chills. Her only complaint is back pain 2/2 malignancy. Adm w/ septic shock 2/2 HCAP.     Pt reports poor appetite but is trying to eat as much as she can - RD observed pt consumed 50% bkfst this am 12/20/23. Endorses UBW of 125# that has decreased to 97# since April - 22.4% x 8 mo (severe, clinsig); bed scale wt of 100.44# obtained by RD on 12/20/23. NFPE reveals moderate-severe muscle/fat wasting - appears frail, thin, bony. Recommend [employer] BID to optimize nutritional needs (provides 325 kcal, 16 g protein/ shake) 2/2 IBS. C/w Regular diet to optimize nutrient and caloric intake. See below for further recommendations.

## 2023-12-20 NOTE — PROGRESS NOTE ADULT - ASSESSMENT
79 y/o F w/ PMH of urothelial carcinoma w/ mets (on chemo), CVA, osteoarthritis, IBS, hypothyroidism. 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?), 2nd degree AV block (patient declined PPM), p/w hypotension    Septic shock 2/2 HCAP (suspect gram negative PNA)  Despite IV bolus mido and IV abx, pressure is low  CXR reviewed  on Vanco / Cefepime  blood cultures collected  Lactate = 2   COVID / Flu negative   plan:  vanc/cefepime  ID and Heme consulted  check MRSA PCR  check juan and tsh  ordered for CT chest   give one more bolus of fluid and midodrine 20  ordered for solucortef (after cortisol checked)  Intensivist consulted    *Urothelial carcinoma / Anemia   -F/u heme/onc     SAILAJA prerenal  -Renal U/S     H/o 2nd degree heart block  3 second pause on tele  Patient previously declined PPM  plan;  echo cardiology  Remote tele     *Advance Directives  -Patient's primary HCP is her son Yoan     *DVT ppx   -Heparin SubQ and trend H/H closely     >75 mins spent on this admission

## 2023-12-20 NOTE — CONSULT NOTE ADULT - SUBJECTIVE AND OBJECTIVE BOX
CHIEF COMPLAINT:       HPI:  79 yo female with a hx of metastatic urothelial carcinoma (on chemo), CVA, osteoarthritis, IBS, hypothyroidism. 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 Pt denies recent SOB, cough, fevers/chills. Pt admitted for multifocal pneumonia.     Pt noted to have pause on tele for 3 seconds for which cardiology was consulted. She did not have any symptoms at the time. Per chart, pt has hx AVB for which she declined PPM. However, pt denies hx cardiac problems. She has followed with cardiologist, Dr. Magdiel Smith (cardio-oncology) for preventive measures. She denies chest pain, SOB, exertional angina or dyspnea, syncope. She does note general lightheadedness since undergoing cancer treatment but none specifically while admitted.     ECG shows NSR 93 bpm, normal intervals, no conduction delays or ST-T wave changes.          PSH: Cholecystectomy     Social Hx: Former smoker 35 years ago (1ppd x 20 years), etoh / drugs - Denies     Family Hx: Brother - lung cancer  (20 Dec 2023 02:40)      PAST MEDICAL / SURGICAL HISTORY:  PAST MEDICAL & SURGICAL HISTORY:  Urothelial carcinoma of kidney          SOCIAL HISTORY:   Alcohol: Denied  Smoking: Nonsmoker  Drug Use: Denied  Marital Status:     FAMILY HISTORY: FAMILY HISTORY:      MEDICATIONS  (STANDING):  aspirin enteric coated 81 milliGRAM(s) Oral daily  cefepime  Injectable. 2000 milliGRAM(s) IV Push <User Schedule>  doxycycline monohydrate Capsule 100 milliGRAM(s) Oral every 12 hours  heparin   Injectable 5000 Unit(s) SubCutaneous every 12 hours  hydrocortisone sodium succinate Injectable 125 milliGRAM(s) IV Push once  levothyroxine 125 MICROGram(s) Oral daily  pantoprazole    Tablet 40 milliGRAM(s) Oral before breakfast  predniSONE   Tablet 5 milliGRAM(s) Oral daily    MEDICATIONS  (PRN):  acetaminophen     Tablet .. 650 milliGRAM(s) Oral every 4 hours PRN Mild Pain (1 - 3)      Allergies    No Known Allergies    Intolerances        REVIEW OF SYSTEMS:  CONSTITUTIONAL: + weakness, no fevers or chills  Eyes: No visual changes  NECK: No pain or stiffness  RESPIRATORY: No cough, wheezing, hemoptysis; No shortness of breath  CARDIOVASCULAR: No chest pain or palpitations  GASTROINTESTINAL: No abdominal pain. No nausea, vomiting, or hematemesis; No diarrhea or constipation. No melena or hematochezia.  GENITOURINARY: No dysuria, frequency or hematuria  NEUROLOGICAL: No numbness.  SKIN: No itching or rash  All other review of systems is negative unless indicated above    VITAL SIGNS:   Vital Signs Last 24 Hrs  T(C): 36.6 (20 Dec 2023 07:36), Max: 37.3 (19 Dec 2023 19:23)  T(F): 97.9 (20 Dec 2023 07:36), Max: 99.1 (19 Dec 2023 19:23)  HR: 76 (20 Dec 2023 12:12) (73 - 96)  BP: 83/55 (20 Dec 2023 12:12) (80/65 - 110/95)  BP(mean): 64 (20 Dec 2023 12:12) (64 - 80)  RR: 16 (20 Dec 2023 07:36) (12 - 22)  SpO2: 92% (20 Dec 2023 07:36) (90% - 98%)    Parameters below as of 20 Dec 2023 07:36  Patient On (Oxygen Delivery Method): room air        I&O's Summary      PHYSICAL EXAM:  Constitutional: NAD, awake and alert  HEENT:  EOMI,  Pupils round, No oral cyanosis.  Pulmonary: Non-labored, breath sounds are clear bilaterally, No wheezing, rales or rhonchi  Cardiovascular: S1 and S2, regular rate and rhythm, no Murmurs, gallops or rubs  Gastrointestinal: Bowel Sounds present, soft, nontender.   Lymph: No peripheral edema. No cervical lymphadenopathy.  Neurological: Alert, no focal deficits  Skin: No rashes.  Psych:  Mood & affect appropriate    LABS:                        7.5    14.81 )-----------( 175      ( 20 Dec 2023 07:11 )             24.1                         8.1    13.84 )-----------( 174      ( 19 Dec 2023 18:51 )             25.5     20 Dec 2023 07:11    137    |  109    |  25     ----------------------------<  99     3.6     |  23     |  1.41   19 Dec 2023 18:51    132    |  101    |  29     ----------------------------<  176    3.9     |  26     |  1.67     Ca    7.7        20 Dec 2023 07:11  Ca    7.9        19 Dec 2023 18:51    TPro  5.1    /  Alb  1.8    /  TBili  0.3    /  DBili  x      /  AST  18     /  ALT  14     /  AlkPhos  133    20 Dec 2023 07:11  TPro  5.6    /  Alb  2.1    /  TBili  0.6    /  DBili  x      /  AST  21     /  ALT  18     /  AlkPhos  164    19 Dec 2023 18:51    PT/INR - ( 20 Dec 2023 07:11 )   PT: 12.2 sec;   INR: 1.08 ratio         PTT - ( 20 Dec 2023 07:11 )  PTT:29.4 sec        12-20 @ 10:21  TSH: 9.84    TTE 12/20/23  mpression     Summary     The mitral valve was well visualized.   The mitral valve leaflets appear thin and normal.   Trace mitral regurgitation is present.   The aortic valve is well visualized, appears mildly calcified. Valve   opening seems to be normal.   The tricuspid valve leaflets are thin and pliable; valve motion is   normal.   Mild (1+) tricuspid valve regurgitation is present.   Normal appearing pulmonic valve structure.   Mild pulmonic valvular regurgitation (1+) is present.   Normal appearing left atrium.   The left ventricle is normal in size, wall thickness, wall motion and   contractility as seen in limited views.   Estimated left ventricular ejection fraction is 60-65 %.     Signature     ----------------------------------------------------------------   Electronically signed by Allyssa Shelby MD(Interpreting   physician) on 12/20/2023 01:41 PM   ----------------------------------------------------------------       CHIEF COMPLAINT:       HPI:  77 yo female with a hx of metastatic urothelial carcinoma (on chemo), CVA, osteoarthritis, IBS, hypothyroidism. 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 Pt denies recent SOB, cough, fevers/chills. Pt admitted for multifocal pneumonia.     Pt noted to have pause on tele for 3 seconds for which cardiology was consulted. She did not have any symptoms at the time. Per chart, pt has hx AVB for which she declined PPM. However, pt denies hx cardiac problems. She has followed with cardiologist, Dr. Magdiel Smith (cardio-oncology) for preventive measures. She denies chest pain, SOB, exertional angina or dyspnea, syncope. She does note general lightheadedness since undergoing cancer treatment but none specifically while admitted.     ECG shows NSR 93 bpm, normal intervals, no conduction delays or ST-T wave changes.          PSH: Cholecystectomy     Social Hx: Former smoker 35 years ago (1ppd x 20 years), etoh / drugs - Denies     Family Hx: Brother - lung cancer  (20 Dec 2023 02:40)      PAST MEDICAL / SURGICAL HISTORY:  PAST MEDICAL & SURGICAL HISTORY:  Urothelial carcinoma of kidney          SOCIAL HISTORY:   Alcohol: Denied  Smoking: Nonsmoker  Drug Use: Denied  Marital Status:     FAMILY HISTORY: FAMILY HISTORY:      MEDICATIONS  (STANDING):  aspirin enteric coated 81 milliGRAM(s) Oral daily  cefepime  Injectable. 2000 milliGRAM(s) IV Push <User Schedule>  doxycycline monohydrate Capsule 100 milliGRAM(s) Oral every 12 hours  heparin   Injectable 5000 Unit(s) SubCutaneous every 12 hours  hydrocortisone sodium succinate Injectable 125 milliGRAM(s) IV Push once  levothyroxine 125 MICROGram(s) Oral daily  pantoprazole    Tablet 40 milliGRAM(s) Oral before breakfast  predniSONE   Tablet 5 milliGRAM(s) Oral daily    MEDICATIONS  (PRN):  acetaminophen     Tablet .. 650 milliGRAM(s) Oral every 4 hours PRN Mild Pain (1 - 3)      Allergies    No Known Allergies    Intolerances        REVIEW OF SYSTEMS:  CONSTITUTIONAL: + weakness, no fevers or chills  Eyes: No visual changes  NECK: No pain or stiffness  RESPIRATORY: No cough, wheezing, hemoptysis; No shortness of breath  CARDIOVASCULAR: No chest pain or palpitations  GASTROINTESTINAL: No abdominal pain. No nausea, vomiting, or hematemesis; No diarrhea or constipation. No melena or hematochezia.  GENITOURINARY: No dysuria, frequency or hematuria  NEUROLOGICAL: No numbness.  SKIN: No itching or rash  All other review of systems is negative unless indicated above    VITAL SIGNS:   Vital Signs Last 24 Hrs  T(C): 36.6 (20 Dec 2023 07:36), Max: 37.3 (19 Dec 2023 19:23)  T(F): 97.9 (20 Dec 2023 07:36), Max: 99.1 (19 Dec 2023 19:23)  HR: 76 (20 Dec 2023 12:12) (73 - 96)  BP: 83/55 (20 Dec 2023 12:12) (80/65 - 110/95)  BP(mean): 64 (20 Dec 2023 12:12) (64 - 80)  RR: 16 (20 Dec 2023 07:36) (12 - 22)  SpO2: 92% (20 Dec 2023 07:36) (90% - 98%)    Parameters below as of 20 Dec 2023 07:36  Patient On (Oxygen Delivery Method): room air        I&O's Summary      PHYSICAL EXAM:  Constitutional: NAD, awake and alert  HEENT:  EOMI,  Pupils round, No oral cyanosis.  Pulmonary: Non-labored, breath sounds are clear bilaterally, No wheezing, rales or rhonchi  Cardiovascular: S1 and S2, regular rate and rhythm, no Murmurs, gallops or rubs  Gastrointestinal: Bowel Sounds present, soft, nontender.   Lymph: No peripheral edema. No cervical lymphadenopathy.  Neurological: Alert, no focal deficits  Skin: No rashes.  Psych:  Mood & affect appropriate    LABS:                        7.5    14.81 )-----------( 175      ( 20 Dec 2023 07:11 )             24.1                         8.1    13.84 )-----------( 174      ( 19 Dec 2023 18:51 )             25.5     20 Dec 2023 07:11    137    |  109    |  25     ----------------------------<  99     3.6     |  23     |  1.41   19 Dec 2023 18:51    132    |  101    |  29     ----------------------------<  176    3.9     |  26     |  1.67     Ca    7.7        20 Dec 2023 07:11  Ca    7.9        19 Dec 2023 18:51    TPro  5.1    /  Alb  1.8    /  TBili  0.3    /  DBili  x      /  AST  18     /  ALT  14     /  AlkPhos  133    20 Dec 2023 07:11  TPro  5.6    /  Alb  2.1    /  TBili  0.6    /  DBili  x      /  AST  21     /  ALT  18     /  AlkPhos  164    19 Dec 2023 18:51    PT/INR - ( 20 Dec 2023 07:11 )   PT: 12.2 sec;   INR: 1.08 ratio         PTT - ( 20 Dec 2023 07:11 )  PTT:29.4 sec        12-20 @ 10:21  TSH: 9.84    TTE 12/20/23  mpression     Summary     The mitral valve was well visualized.   The mitral valve leaflets appear thin and normal.   Trace mitral regurgitation is present.   The aortic valve is well visualized, appears mildly calcified. Valve   opening seems to be normal.   The tricuspid valve leaflets are thin and pliable; valve motion is   normal.   Mild (1+) tricuspid valve regurgitation is present.   Normal appearing pulmonic valve structure.   Mild pulmonic valvular regurgitation (1+) is present.   Normal appearing left atrium.   The left ventricle is normal in size, wall thickness, wall motion and   contractility as seen in limited views.   Estimated left ventricular ejection fraction is 60-65 %.     Signature     ----------------------------------------------------------------   Electronically signed by Allyssa Shelby MD(Interpreting   physician) on 12/20/2023 01:41 PM   ----------------------------------------------------------------

## 2023-12-20 NOTE — CONSULT NOTE ADULT - PROBLEM SELECTOR RECOMMENDATION 9
Pt had 3 sec pause on tele, asymptomatic   ECG NSR without evidence of AVB. No other episodes on tele thus far.   Per chart review pt has reported hx AVB, declining PPM however pt denies hx cardiac problems. ECG and tele has been otherwise unremarkable.   TTE shows normal LV function no significant valvular disease  Cont to monitor on tele for now for at least additional 24 h

## 2023-12-20 NOTE — PROVIDER CONTACT NOTE (OTHER) - ASSESSMENT
pt denies any lightheadedness, pain or discomfort pt a/o x4 and able to make needs known, denies lightheadedness. as per tele pt had 3 sec pause on monitor.

## 2023-12-20 NOTE — H&P ADULT - HISTORY OF PRESENT ILLNESS
79 y/o F w/ PMH of urothelial carcinoma w/ mets (on chemo???), CVA, osteoarthritis, IBS, hypothyroidism. 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 [facility] [place] [facility] and found to have BP of 70/40 and was also told she had pneumonia on CT scan and was referred to ED for further evaluation. Patient denies cough, runny nose, sore throat, fever, chills. Her only complaint is back pain 2/2 malignancy.       PSH: Cholecystectomy     Social Hx: Former smoker 35 years ago (1ppd x 20 years), etoh / drugs - Denies     Family Hx: Brother - lung cancer  79 y/o F w/ PMH of urothelial carcinoma w/ mets (on chemo???), CVA, osteoarthritis, IBS, hypothyroidism. 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 Patient was at [facility] [place] [place] and found to have BP of 70/40 and was also told she had pneumonia on CT scan and was referred to ED for further evaluation. Patient denies cough, runny nose, sore throat, fever, chills. Her only complaint is back pain 2/2 malignancy.       PSH: Cholecystectomy     Social Hx: Former smoker 35 years ago (1ppd x 20 years), etoh / drugs - Denies     Family Hx: Brother - lung cancer

## 2023-12-20 NOTE — CONSULT NOTE ADULT - ASSESSMENT
78 metastatic urothelial carcinoma (on chemo), CVA, osteoarthritis, IBS, hypothyroidism. dyslipidemia, eferred to ED by oncology for hypotension and concern for PNA on CT scan. Pt denies recent SOB, cough, fevers/chills. Pt admitted for multifocal pneumonia with renal evalution of SAILAJA. Patient reports being aware of elevated creatinine a few weeks ago at [facility] [place]       SAILAJA  -Prerenal state suspected, response to volume  -IVF if po not at goal  -Keep positive  -No nsaids/contrast  -R kidney >L?    PNA  -Abx  -Medicine follow up    dc/ with RN staff, team  Seen this AM, note now 78 metastatic urothelial carcinoma (on chemo), CVA, osteoarthritis, IBS, hypothyroidism. dyslipidemia, eferred to ED by oncology for hypotension and concern for PNA on CT scan. Pt denies recent SOB, cough, fevers/chills. Pt admitted for multifocal pneumonia with renal evalution of SAILAJA. Patient reports being aware of elevated creatinine a few weeks ago at [facility] [place]       SAILAJA  -Prerenal state suspected, response to volume  -IVF if po not at goal  -Keep positive  -No nsaids/contrast  -R kidney >L?    PNA  -Abx  -Medicine follow up    dc/ with RN staff, team  Seen this AM, note now

## 2023-12-20 NOTE — DIETITIAN INITIAL EVALUATION ADULT - PERTINENT MEDS FT
MEDICATIONS  (STANDING):  aspirin enteric coated 81 milliGRAM(s) Oral daily  cefepime  Injectable. 2000 milliGRAM(s) IV Push <User Schedule>  doxycycline monohydrate Capsule 100 milliGRAM(s) Oral every 12 hours  heparin   Injectable 5000 Unit(s) SubCutaneous every 12 hours  hydrocortisone sodium succinate Injectable 125 milliGRAM(s) IV Push once  levothyroxine 125 MICROGram(s) Oral daily  pantoprazole    Tablet 40 milliGRAM(s) Oral before breakfast  predniSONE   Tablet 5 milliGRAM(s) Oral daily    MEDICATIONS  (PRN):  acetaminophen     Tablet .. 650 milliGRAM(s) Oral every 4 hours PRN Mild Pain (1 - 3)

## 2023-12-20 NOTE — DIETITIAN INITIAL EVALUATION ADULT - ORAL INTAKE PTA/DIET HISTORY
Pt has been on chemo since April and has been experiencing poor appetite, taste changes, and wt loss since beginning treatment - meeting <75% ENN chronically. Tries to drink [employer] high kcal protein shake 1x/day. Pt reports having IBS.

## 2023-12-20 NOTE — DIETITIAN INITIAL EVALUATION ADULT - ORAL NUTRITION SUPPLEMENTS
[facility] BID to optimize nutritional needs (provides 325 kcal, 16 g protein/ shake)  [facility] BID to optimize nutritional needs (provides 325 kcal, 16 g protein/ shake)

## 2023-12-20 NOTE — H&P ADULT - NSHPOUTPATIENTPROVIDERS_GEN_ALL_CORE
PCP: Dr. Henrik Gruber  Onc: Dr. Kaelyn Marvin at [facility] [place] PCP: Dr. Henrik Gruber  Onc: Dr. Kaelyn Marvin at [facility]

## 2023-12-20 NOTE — ED ADULT NURSE REASSESSMENT NOTE - NS ED NURSE REASSESS COMMENT FT1
Pt A&Ox3. Spoke with Dr. Randhawa about unchanged BP since fluids finished, will medicate as per orders in MAR for pain and low BP. No acute distress noted at this time. Pt denies SOB, dizziness. Safety measures maintained, stretcher locked and in lowest position.

## 2023-12-20 NOTE — ED ADULT NURSE REASSESSMENT NOTE - NS ED NURSE REASSESS COMMENT FT1
ICU PAs at bedside assessing pt, stated that pt is okay to go to [facility] with remote tele with current BPs. Pt denies any symptoms, dizziness, SOB, chest pain. Pt is A&Ox3. No acute distress at this time. VS as charted, respirations equal and unlabored. Safety measures maintained, stretcher locked and in lowest position, both side rails up. ICU PAs at bedside assessing pt, stated that pt is okay to go to [facility] with remote tele with current BPs. Pt denies any symptoms, dizziness, SOB, chest pain. Pt is A&Ox3. No acute distress at this time. VS as charted, respirations equal and unlabored. Safety measures maintained, stretcher locked and in lowest position, both side rails up.

## 2023-12-20 NOTE — ED ADULT NURSE REASSESSMENT NOTE - NS ED NURSE REASSESS COMMENT FT1
Dr. Chang at bedside assessing pt. MD stated that she will put in a SICU consult to assess pt's BP. No acute distress noted at this time.

## 2023-12-20 NOTE — DIETITIAN NUTRITION RISK NOTIFICATION - ADDITIONAL COMMENTS/DIETITIAN RECOMMENDATIONS
1. C/w Regular diet to help optimize nutrient and caloric intake  2. Recommend [employer] BID to optimize nutritional needs (provides 325 kcal, 16 g protein/ shake)   3. Consider adding appetite stimulant such as Remeron or Marinol 2/2 chronically poor appetite/ PO intake   4. Consider adding thiamine 100 mg daily 2/2 poor PO intake/ malnutrition   5. Recommend MVI w/ minerals daily to ensure 100% RDA met   6. Encourage protein-rich foods, maximize food preferences   7. Monitor bowel movements, if no BM for >3 days, consider implementing bowel regimen.   8. Obtain vitamin D 25OH level to assess nutriture   9. Please obtain weekly weights   10. Confirm goals of care regarding nutrition support   RD will continue to monitor PO intake, labs, hydration, and wt prn. 1. C/w Regular diet to help optimize nutrient and caloric intake  2. Recommend [employer] BID to optimize nutritional needs (provides 325 kcal, 16 g protein/ shake)   3. Consider adding appetite stimulant such as Remeron or Marinol 2/2 chronically poor appetite/ PO intake   4. Consider adding thiamine 100 mg daily 2/2 poor PO intake/ malnutrition   5. Recommend MVI w/ minerals daily to ensure 100% RDA met   6. Encourage protein-rich foods, maximize food preferences   7. Monitor bowel movements, if no BM for >3 days, consider implementing bowel regimen.   8. Obtain vitamin D 25OH level to assess nutriture   9. Please obtain weekly weights   10. Confirm goals of care regarding nutrition support   RD will continue to monitor PO intake, labs, hydration, and wt prn.

## 2023-12-20 NOTE — PATIENT PROFILE ADULT - FALL HARM RISK - HARM RISK INTERVENTIONS
Assistance with ambulation/Assistance OOB with selected safe patient handling equipment/Communicate Risk of Fall with Harm to all staff/Reinforce activity limits and safety measures with patient and family/Tailored Fall Risk Interventions/Visual Cue: Yellow wristband and red socks/Bed in lowest position, wheels locked, appropriate side rails in place/Call bell, personal items and telephone in reach/Instruct patient to call for assistance before getting out of bed or chair/Non-slip footwear when patient is out of bed/[place] to call system/Physically safe environment - no spills, clutter or unnecessary equipment/Purposeful Proactive Rounding/Room/bathroom lighting operational, light cord in reach Assistance with ambulation/Assistance OOB with selected safe patient handling equipment/Communicate Risk of Fall with Harm to all staff/Reinforce activity limits and safety measures with patient and family/Tailored Fall Risk Interventions/Visual Cue: Yellow wristband and red socks/Bed in lowest position, wheels locked, appropriate side rails in place/Call bell, personal items and telephone in reach/Instruct patient to call for assistance before getting out of bed or chair/Non-slip footwear when patient is out of bed/[place] to call system/Physically safe environment - no spills, clutter or unnecessary equipment/Purposeful Proactive Rounding/Room/bathroom lighting operational, light cord in reach

## 2023-12-20 NOTE — DIETITIAN INITIAL EVALUATION ADULT - ADD RECOMMEND
1. C/w Regular diet to help optimize nutrient and caloric intake  2. Recommend [employer] BID to optimize nutritional needs (provides 325 kcal, 16 g protein/ shake)   3. Consider adding appetite stimulant such as Remeron or Marinol 2/2 chronically poor appetite/ PO intake   4. Consider adding thiamine 100 mg daily 2/2 poor PO intake/ malnutrition   5. Recommend MVI w/ minerals daily to ensure 100% RDA met   6. Encourage protein-rich foods, maximize food preferences   7. Monitor bowel movements, if no BM for >3 days, consider implementing bowel regimen.   8. Obtain vitamin D 25OH level to assess nutriture   9. Please obtain weekly weights   10. Confirm goals of care regarding nutrition support   RD will continue to monitor PO intake, labs, hydration, and wt prn.  1. C/w Regular diet to help optimize nutrient and caloric intake  2. Recommend [employer] BID to optimize nutritional needs (provides 325 kcal, 16 g protein/ shake)   3. Consider adding appetite stimulant such as Remeron or Marinol 2/2 chronically poor appetite/ PO intake   4. Consider adding thiamine 100 mg daily 2/2 poor PO intake/ malnutrition   5. Recommend MVI w/ minerals daily to ensure 100% RDA met   6. Encourage protein-rich foods, maximize food preferences   7. Monitor bowel movements, if no BM for >3 days, consider implementing bowel regimen.   8. Obtain vitamin D 25OH level to assess nutriture   9. Please obtain weekly weights   10. Confirm goals of care regarding nutrition support   RD will continue to monitor PO intake, labs, hydration, and wt prn.

## 2023-12-20 NOTE — CONSULT NOTE ADULT - SUBJECTIVE AND OBJECTIVE BOX
HPI:  79 y/o F w/ PMH of urothelial carcinoma w/ mets (on chemo???), CVA, osteoarthritis, IBS, hypothyroidism. 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 Patient was at [facility] [place] and found to have BP of 70/40 and was also told she had pneumonia on CT scan and was referred to ED for further evaluation. Patient denies cough, runny nose, sore throat, fever, chills. Her only complaint is back pain 2/2 malignancy.       PSH: Cholecystectomy     Social Hx: Former smoker 35 years ago (1ppd x 20 years), etoh / drugs - Denies     Family Hx: Brother - lung cancer  (20 Dec 2023 02:40)      PAST MEDICAL & SURGICAL HISTORY:  Urothelial carcinoma of kidney          Allergies    No Known Allergies    Intolerances        MEDICATIONS  (STANDING):  aspirin enteric coated 81 milliGRAM(s) Oral daily  cefepime  Injectable. 2000 milliGRAM(s) IV Push <User Schedule>  doxycycline monohydrate Capsule 100 milliGRAM(s) Oral every 12 hours  heparin   Injectable 5000 Unit(s) SubCutaneous every 12 hours  levothyroxine 125 MICROGram(s) Oral daily  pantoprazole    Tablet 40 milliGRAM(s) Oral before breakfast  predniSONE   Tablet 5 milliGRAM(s) Oral daily    MEDICATIONS  (PRN):  acetaminophen     Tablet .. 650 milliGRAM(s) Oral every 4 hours PRN Mild Pain (1 - 3)      FAMILY HISTORY:      SOCIAL HISTORY: No EtOH, no tobacco    REVIEW OF SYSTEMS:    CONSTITUTIONAL: No weakness, fevers or chills  EYES/ENT: No visual changes;  No vertigo or throat pain   NECK: No pain or stiffness  RESPIRATORY: No cough, wheezing, hemoptysis; No shortness of breath  CARDIOVASCULAR: No chest pain or palpitations  GASTROINTESTINAL: No abdominal or epigastric pain. No nausea, vomiting, or hematemesis; No diarrhea or constipation. No melena or hematochezia.  GENITOURINARY: No dysuria, frequency or hematuria  NEUROLOGICAL: No numbness or weakness  SKIN: No itching, burning, rashes, or lesions   All other review of systems is negative unless indicated above.        T(F): 98.4 (12-20-23 @ 20:20), Max: 98.4 (12-20-23 @ 20:20)  HR: 81 (12-20-23 @ 20:20)  BP: 114/72 (12-20-23 @ 20:20)  RR: 16 (12-20-23 @ 20:20)  SpO2: 92% (12-20-23 @ 20:20)  Wt(kg): --    GENERAL: NAD, well-developed  HEAD:  Atraumatic, Normocephalic  EYES: EOMI, PERRLA, conjunctiva and sclera clear  NECK: Supple, No JVD  CHEST/LUNG: Clear to auscultation bilaterally; No wheeze  HEART: Regular rate and rhythm; No murmurs, rubs, or gallops  ABDOMEN: Soft, Nontender, Nondistended; Bowel sounds present  EXTREMITIES:  2+ Peripheral Pulses, No clubbing, cyanosis, or edema  NEUROLOGY: non-focal  SKIN: No rashes or lesions                          7.5    14.81 )-----------( 175      ( 20 Dec 2023 07:11 )             24.1       12-20    137  |  109<H>  |  25<H>  ----------------------------<  99  3.6   |  23  |  1.41<H>    Ca    7.7<L>      20 Dec 2023 07:11    TPro  5.1<L>  /  Alb  1.8<L>  /  TBili  0.3  /  DBili  x   /  AST  18  /  ALT  14  /  AlkPhos  133<H>  12-20          PT/INR - ( 20 Dec 2023 07:11 )   PT: 12.2 sec;   INR: 1.08 ratio         PTT - ( 20 Dec 2023 07:11 )  PTT:29.4 sec     HPI:  79 y/o F w/ PMH of urothelial carcinoma w/ mets (on chemo???), CVA, osteoarthritis, IBS, hypothyroidism. 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 Patient was at [facility] [place] and found to have BP of 70/40 and was also told she had pneumonia on CT scan and was referred to ED for further evaluation. Patient denies cough, runny nose, sore throat, fever, chills. Her only complaint is back pain 2/2 malignancy.       PSH: Cholecystectomy     Social Hx: Former smoker 35 years ago (1ppd x 20 years), etoh / drugs - Denies     Family Hx: Brother - lung cancer  (20 Dec 2023 02:40)      PAST MEDICAL & SURGICAL HISTORY:  Urothelial carcinoma of kidney          Allergies    No Known Allergies    Intolerances        MEDICATIONS  (STANDING):  aspirin enteric coated 81 milliGRAM(s) Oral daily  cefepime  Injectable. 2000 milliGRAM(s) IV Push <User Schedule>  doxycycline monohydrate Capsule 100 milliGRAM(s) Oral every 12 hours  heparin   Injectable 5000 Unit(s) SubCutaneous every 12 hours  levothyroxine 125 MICROGram(s) Oral daily  pantoprazole    Tablet 40 milliGRAM(s) Oral before breakfast  predniSONE   Tablet 5 milliGRAM(s) Oral daily    MEDICATIONS  (PRN):  acetaminophen     Tablet .. 650 milliGRAM(s) Oral every 4 hours PRN Mild Pain (1 - 3)      FAMILY HISTORY:      SOCIAL HISTORY: No EtOH, no tobacco    REVIEW OF SYSTEMS:    CONSTITUTIONAL: No weakness, fevers or chills  EYES/ENT: No visual changes;  No vertigo or throat pain   NECK: No pain or stiffness  RESPIRATORY: No cough, wheezing, hemoptysis; No shortness of breath  CARDIOVASCULAR: No chest pain or palpitations  GASTROINTESTINAL: No abdominal or epigastric pain. No nausea, vomiting, or hematemesis; No diarrhea or constipation. No melena or hematochezia.  GENITOURINARY: No dysuria, frequency or hematuria  NEUROLOGICAL: No numbness or weakness  SKIN: No itching, burning, rashes, or lesions   All other review of systems is negative unless indicated above.        T(F): 98.4 (12-20-23 @ 20:20), Max: 98.4 (12-20-23 @ 20:20)  HR: 81 (12-20-23 @ 20:20)  BP: 114/72 (12-20-23 @ 20:20)  RR: 16 (12-20-23 @ 20:20)  SpO2: 92% (12-20-23 @ 20:20)  Wt(kg): --    GENERAL: NAD, well-developed  HEAD:  Atraumatic, Normocephalic  EYES: EOMI, PERRLA, conjunctiva and sclera clear  NECK: Supple, No JVD  CHEST/LUNG: Clear to auscultation bilaterally; No wheeze  HEART: Regular rate and rhythm; No murmurs, rubs, or gallops  ABDOMEN: Soft, Nontender, Nondistended; Bowel sounds present  EXTREMITIES:  2+ Peripheral Pulses, No clubbing, cyanosis, or edema  NEUROLOGY: non-focal  SKIN: No rashes or lesions                          7.5    14.81 )-----------( 175      ( 20 Dec 2023 07:11 )             24.1       12-20    137  |  109<H>  |  25<H>  ----------------------------<  99  3.6   |  23  |  1.41<H>    Ca    7.7<L>      20 Dec 2023 07:11    TPro  5.1<L>  /  Alb  1.8<L>  /  TBili  0.3  /  DBili  x   /  AST  18  /  ALT  14  /  AlkPhos  133<H>  12-20          PT/INR - ( 20 Dec 2023 07:11 )   PT: 12.2 sec;   INR: 1.08 ratio         PTT - ( 20 Dec 2023 07:11 )  PTT:29.4 sec

## 2023-12-20 NOTE — CONSULT NOTE ADULT - ASSESSMENT
77 yo female with history of LEFT  upper tract metasttic urothelial carcionma to LN bone and liver presently on PADCEV set in for evaluation of hyptension and hypoxia     metastatic urothelial ca   - - now on PADCEV   - followed by Dr. Marvin [facility]   - patient had been scheduled for next cycle on day of admission and treatment was held     Hypotension   - Possibly secondary to PNA sepsis    - continue with ongoing antibiotics   - patient's oxygen status has improved   - would check cortisol , ACTH     PNA   - now on CEFEPIME and VANCO D/C'd   - now on Doxy   - ID consutl appreciated     -  79 yo female with history of LEFT  upper tract metasttic urothelial carcionma to LN bone and liver presently on PADCEV set in for evaluation of hyptension and hypoxia     metastatic urothelial ca   - - now on PADCEV   - followed by Dr. Marvin [facility] [place]   - patient had been scheduled for next cycle on day of admission and treatment was held     Hypotension   - Possibly secondary to PNA sepsis    - continue with ongoing antibiotics   - patient's oxygen status has improved   - would check cortisol , ACTH     PNA   - now on CEFEPIME and VANCO D/C'd   - now on Doxy   - ID consutl appreciated     -

## 2023-12-20 NOTE — DIETITIAN INITIAL EVALUATION ADULT - SIGNS/SYMPTOMS
AEB pt consuming <75% ENN chronically, moderate-severe muscle/fat wasting AEB pt consuming <75% ENN chronically, moderate-severe muscle/fat wasting, wt loss of 22.4% x 8 mo

## 2023-12-20 NOTE — H&P ADULT - ASSESSMENT
77 y/o F w/ PMH of urothelial carcinoma w/ mets (on chemo), CVA, osteoarthritis, IBS, hypothyroidism. 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 However, after explaining risks again and that it may be contributing to hypotension, patient is agreeable to hold off on further opiates.   -Vanco / Cefepime  -ID consult   -F/u cultures  -ID consult  -Lactate = 2   -COVID / Flu negative     *Urothelial carcinoma / Anemia   -F/u heme/onc     *Hyponatremia  -Recheck in AM     *Suspect SAILAJA  -IVF and recheck creatinine in AM  -Avoid nephrotoxic agents  -UA  -I/Os   -Renal U/S     *Advance Directives  -Patient's primary HCP is her son Yoan     *DVT ppx   -Heparin SubQ and trend H/H closely     >75 mins spent on this admission      79 y/o F w/ PMH of urothelial carcinoma w/ mets (on chemo), CVA, osteoarthritis, IBS, hypothyroidism. 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 However, after explaining risks again and that it may be contributing to hypotension, patient is agreeable to hold off on further opiates.   -Vanco / Cefepime  -ID consult   -F/u cultures  -ID consult  -Lactate = 2   -COVID / Flu negative     *Urothelial carcinoma / Anemia   -F/u heme/onc     *Hyponatremia  -Recheck in AM     *Suspect SAILAJA  -IVF and recheck creatinine in AM  -Avoid nephrotoxic agents  -UA  -I/Os   -Renal U/S     *Advance Directives  -Patient's primary HCP is her son Yoan     *DVT ppx   -Heparin SubQ and trend H/H closely     >75 mins spent on this admission        79 y/o F w/ PMH of urothelial carcinoma w/ mets (on chemo), CVA, osteoarthritis, IBS, hypothyroidism. 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 However, after explaining risks again and that it may be contributing to hypotension, patient is agreeable to hold off on further opiates.   -Vanco / Cefepime  -ID consult   -F/u cultures  -ID consult  -Lactate = 2   -COVID / Flu negative   -Patient states she had CT scan 12 hours prior to arrival and was diagnosed with pneumonia, but unable to find scan on HIE. Will need to obtain outpatient record   -CXR: new patchy left lung consolidation    *Urothelial carcinoma / Anemia   -F/u heme/onc     *Hyponatremia  -Recheck in AM     *Suspect SAILAJA  -IVF and recheck creatinine in AM  -Avoid nephrotoxic agents  -UA  -I/Os   -Renal U/S     *H/o 2nd degree heart block  -Patient previously declined PPM  -Remote tele     *Advance Directives  -Patient's primary HCP is her son Yoan     *DVT ppx   -Heparin SubQ and trend H/H closely     >75 mins spent on this admission

## 2023-12-20 NOTE — CONSULT NOTE ADULT - SUBJECTIVE AND OBJECTIVE BOX
78 metastatic urothelial carcinoma (on chemo), CVA, osteoarthritis, IBS, hypothyroidism. dyslipidemia, eferred to ED by oncology for hypotension and concern for PNA on CT scan. Pt denies recent SOB, cough, fevers/chills. Pt admitted for multifocal pneumonia with renal evalution of SAILAJA. Patient reports being aware of elevated creatinine a few weeks ago [place] [facility] [place]       PAST MEDICAL & SURGICAL HISTORY:  Urothelial carcinoma of kidney          MEDICATIONS  (STANDING):  aspirin enteric coated 81 milliGRAM(s) Oral daily  cefepime  Injectable. 2000 milliGRAM(s) IV Push <User Schedule>  doxycycline monohydrate Capsule 100 milliGRAM(s) Oral every 12 hours  heparin   Injectable 5000 Unit(s) SubCutaneous every 12 hours  levothyroxine 125 MICROGram(s) Oral daily  pantoprazole    Tablet 40 milliGRAM(s) Oral before breakfast  predniSONE   Tablet 5 milliGRAM(s) Oral daily    MEDICATIONS  (PRN):  acetaminophen     Tablet .. 650 milliGRAM(s) Oral every 4 hours PRN Mild Pain (1 - 3)      Allergies    No Known Allergies    Intolerances        SOCIAL HISTORY:    FAMILY HISTORY:      REVIEW OF SYSTEMS:    CONSTITUTIONAL: stable weakness, fevers or chills  EYES/ENT: No visual changes;  No vertigo or throat pain   NECK: No pain or stiffness  RESPIRATORY: No cough, wheezing, hemoptysis; No shortness of breath  CARDIOVASCULAR: No chest pain or palpitations  GASTROINTESTINAL: No abdominal or epigastric pain. No nausea, vomiting, or hematemesis; No diarrhea or constipation. No melena or hematochezia.  GENITOURINARY: No dysuria, frequency or hematuria  NEUROLOGICAL: No numbness or weakness  SKIN: No itching, burning, rashes, or lesions   All other review of systems is negative unless indicated above.      T(C): , Max: 36.9 (12-20-23 @ 20:20)  T(F): , Max: 98.4 (12-20-23 @ 20:20)  HR: 81 (12-20-23 @ 20:20)  BP: 114/72 (12-20-23 @ 20:20)  BP(mean): 76 (12-20-23 @ 15:26)  RR: 16 (12-20-23 @ 20:20)  SpO2: 92% (12-20-23 @ 20:20)  Wt(kg): --        PHYSICAL EXAM:    Constitutional: NAD, frail  HEENT:  MM  dist  Cardiovascular: S1 and S2   Extremities: No peripheral edema  Neurological: A/O x 3, no focal deficits    Access: Not applicable        LABS:                        7.5    14.81 )-----------( 175      ( 20 Dec 2023 07:11 )             24.1     20 Dec 2023 07:11    137    |  109    |  25     ----------------------------<  99     3.6     |  23     |  1.41   19 Dec 2023 18:51    132    |  101    |  29     ----------------------------<  176    3.9     |  26     |  1.67     Ca    7.7        20 Dec 2023 07:11  Ca    7.9        19 Dec 2023 18:51    TPro  5.1    /  Alb  1.8    /  TBili  0.3    /  DBili  x      /  AST  18     /  ALT  14     /  AlkPhos  133    20 Dec 2023 07:11  TPro  5.6    /  Alb  2.1    /  TBili  0.6    /  DBili  x      /  AST  21     /  ALT  18     /  AlkPhos  164    19 Dec 2023 18:51      Hepatitis C Virus S/CO Ratio: 0.08 S/CO [0.00 - 0.99] (12-20 @ 07:11)  Hepatitis C Virus Interpretation: Nonreact (12-20 @ 07:11)      Urine Studies:  Urinalysis Basic - ( 20 Dec 2023 07:11 )    Color: x / Appearance: x / SG: x / pH: x  Gluc: 99 mg/dL / Ketone: x  / Bili: x / Urobili: x   Blood: x / Protein: x / Nitrite: x   Leuk Esterase: x / RBC: x / WBC x   Sq Epi: x / Non Sq Epi: x / Bacteria: x            RADIOLOGY & ADDITIONAL STUDIES:                 78 metastatic urothelial carcinoma (on chemo), CVA, osteoarthritis, IBS, hypothyroidism. dyslipidemia, eferred to ED by oncology for hypotension and concern for PNA on CT scan. Pt denies recent SOB, cough, fevers/chills. Pt admitted for multifocal pneumonia with renal evalution of SAILAJA. Patient reports being [place] [facility] [place]       PAST MEDICAL & SURGICAL HISTORY:  Urothelial carcinoma of kidney          MEDICATIONS  (STANDING):  aspirin enteric coated 81 milliGRAM(s) Oral daily  cefepime  Injectable. 2000 milliGRAM(s) IV Push <User Schedule>  doxycycline monohydrate Capsule 100 milliGRAM(s) Oral every 12 hours  heparin   Injectable 5000 Unit(s) SubCutaneous every 12 hours  levothyroxine 125 MICROGram(s) Oral daily  pantoprazole    Tablet 40 milliGRAM(s) Oral before breakfast  predniSONE   Tablet 5 milliGRAM(s) Oral daily    MEDICATIONS  (PRN):  acetaminophen     Tablet .. 650 milliGRAM(s) Oral every 4 hours PRN Mild Pain (1 - 3)      Allergies    No Known Allergies    Intolerances        SOCIAL HISTORY:    FAMILY HISTORY:      REVIEW OF SYSTEMS:    CONSTITUTIONAL: stable weakness, fevers or chills  EYES/ENT: No visual changes;  No vertigo or throat pain   NECK: No pain or stiffness  RESPIRATORY: No cough, wheezing, hemoptysis; No shortness of breath  CARDIOVASCULAR: No chest pain or palpitations  GASTROINTESTINAL: No abdominal or epigastric pain. No nausea, vomiting, or hematemesis; No diarrhea or constipation. No melena or hematochezia.  GENITOURINARY: No dysuria, frequency or hematuria  NEUROLOGICAL: No numbness or weakness  SKIN: No itching, burning, rashes, or lesions   All other review of systems is negative unless indicated above.      T(C): , Max: 36.9 (12-20-23 @ 20:20)  T(F): , Max: 98.4 (12-20-23 @ 20:20)  HR: 81 (12-20-23 @ 20:20)  BP: 114/72 (12-20-23 @ 20:20)  BP(mean): 76 (12-20-23 @ 15:26)  RR: 16 (12-20-23 @ 20:20)  SpO2: 92% (12-20-23 @ 20:20)  Wt(kg): --        PHYSICAL EXAM:    Constitutional: NAD, frail  HEENT:  MM  dist  Cardiovascular: S1 and S2   Extremities: No peripheral edema  Neurological: A/O x 3, no focal deficits    Access: Not applicable        LABS:                        7.5    14.81 )-----------( 175      ( 20 Dec 2023 07:11 )             24.1     20 Dec 2023 07:11    137    |  109    |  25     ----------------------------<  99     3.6     |  23     |  1.41   19 Dec 2023 18:51    132    |  101    |  29     ----------------------------<  176    3.9     |  26     |  1.67     Ca    7.7        20 Dec 2023 07:11  Ca    7.9        19 Dec 2023 18:51    TPro  5.1    /  Alb  1.8    /  TBili  0.3    /  DBili  x      /  AST  18     /  ALT  14     /  AlkPhos  133    20 Dec 2023 07:11  TPro  5.6    /  Alb  2.1    /  TBili  0.6    /  DBili  x      /  AST  21     /  ALT  18     /  AlkPhos  164    19 Dec 2023 18:51      Hepatitis C Virus S/CO Ratio: 0.08 S/CO [0.00 - 0.99] (12-20 @ 07:11)  Hepatitis C Virus Interpretation: Nonreact (12-20 @ 07:11)      Urine Studies:  Urinalysis Basic - ( 20 Dec 2023 07:11 )    Color: x / Appearance: x / SG: x / pH: x  Gluc: 99 mg/dL / Ketone: x  / Bili: x / Urobili: x   Blood: x / Protein: x / Nitrite: x   Leuk Esterase: x / RBC: x / WBC x   Sq Epi: x / Non Sq Epi: x / Bacteria: x            RADIOLOGY & ADDITIONAL STUDIES:

## 2023-12-20 NOTE — PROGRESS NOTE ADULT - SUBJECTIVE AND OBJECTIVE BOX
Patient seen and examined  sitting in bed  asymptomatic as per patient  patient inquiring about  "sepsis diagonsis"  vitals: bp 80s despite 2.7 liter bolus in ED, Vanc cefepime and midodrine  on pred 5 daily  on vanc and cefepime  oncology consulted  Review of Systems:  General:denies fever chills, headache, weakness  HEENT: denies blurry vision,diffculty swallowing, difficulty hearing, tinnitus  Cardiovascular: denies chest pain  ,palpitations  Pulmonary:denies shortness of breath, cough, wheezing, hemoptysis  Gastrointestinal: denies abdominal pain, constipation, diarrhea,nausea , vomiting, hematochezia  : denies hematuria, dysuria, or incontinence  Neurological: denies weakness, numbness , tingling, dizziness, tremors  MSK: denies muscle pain, difficulty ambulating, swelling, back pain  skin: denies skin rash, itching, burning, or  skin lesions  Psychiatrical: denies mood disturbances, anxierty, feeling depressed, depression , or difficulty sleeping    Objective:  Vitals  T(C): 36.6 (12-20-23 @ 07:36), Max: 37.3 (12-19-23 @ 19:23)  HR: 73 (12-20-23 @ 07:36) (73 - 96)  BP: 83/49 (12-20-23 @ 07:36) (80/65 - 110/95)  RR: 16 (12-20-23 @ 07:36) (12 - 22)  SpO2: 92% (12-20-23 @ 07:36) (90% - 98%)    Physical Exam:  General: comfortable, no acute distress  HEENT: Atraumatic, no LAD, trachea midline, PERRLA  Cardiovascular: normal s1s2, no murmurs, gallops or fricition rubs  Pulmonary: clear to ausculation Bilaterally, no wheezing , rhonchi  Gastrointestinal: soft non tender non distended, no masses felt, no organomegally  Muscloskeletal: no lower extremity edema, intact bilateral lower extremity pulses  Neurological: CN II-12 intact. No focal weakness  Psychiatrical: normal mood, cooperative  SKIN: no rash, lesions or ulcers    Labs:                          7.5    14.81 )-----------( 175      ( 20 Dec 2023 07:11 )             24.1     12-20    137  |  109<H>  |  25<H>  ----------------------------<  99  3.6   |  23  |  1.41<H>    Ca    7.7<L>      20 Dec 2023 07:11    TPro  5.1<L>  /  Alb  1.8<L>  /  TBili  0.3  /  DBili  x   /  AST  18  /  ALT  14  /  AlkPhos  133<H>  12-20    LIVER FUNCTIONS - ( 20 Dec 2023 07:11 )  Alb: 1.8 g/dL / Pro: 5.1 gm/dL / ALK PHOS: 133 U/L / ALT: 14 U/L / AST: 18 U/L / GGT: x           PT/INR - ( 20 Dec 2023 07:11 )   PT: 12.2 sec;   INR: 1.08 ratio         PTT - ( 20 Dec 2023 07:11 )  PTT:29.4 sec      Active Medications  MEDICATIONS  (STANDING):  aspirin enteric coated 81 milliGRAM(s) Oral daily  cefepime  Injectable. 2000 milliGRAM(s) IV Push <User Schedule>  heparin   Injectable 5000 Unit(s) SubCutaneous every 12 hours  hydrocortisone sodium succinate Injectable 125 milliGRAM(s) IV Push once  levothyroxine 125 MICROGram(s) Oral daily  pantoprazole    Tablet 40 milliGRAM(s) Oral before breakfast  predniSONE   Tablet 5 milliGRAM(s) Oral daily  vancomycin  IVPB 750 milliGRAM(s) IV Intermittent <User Schedule>    MEDICATIONS  (PRN):  acetaminophen     Tablet .. 650 milliGRAM(s) Oral every 4 hours PRN Mild Pain (1 - 3)

## 2023-12-21 ENCOUNTER — TRANSCRIPTION ENCOUNTER (OUTPATIENT)
Age: 78
End: 2023-12-21

## 2023-12-21 LAB
A1C WITH ESTIMATED AVERAGE GLUCOSE RESULT: 5.6 % — SIGNIFICANT CHANGE UP (ref 4–5.6)
A1C WITH ESTIMATED AVERAGE GLUCOSE RESULT: 5.6 % — SIGNIFICANT CHANGE UP (ref 4–5.6)
ANION GAP SERPL CALC-SCNC: 7 MMOL/L — SIGNIFICANT CHANGE UP (ref 5–17)
ANION GAP SERPL CALC-SCNC: 7 MMOL/L — SIGNIFICANT CHANGE UP (ref 5–17)
BASOPHILS # BLD AUTO: 0.02 K/UL — SIGNIFICANT CHANGE UP (ref 0–0.2)
BASOPHILS # BLD AUTO: 0.02 K/UL — SIGNIFICANT CHANGE UP (ref 0–0.2)
BASOPHILS NFR BLD AUTO: 0.2 % — SIGNIFICANT CHANGE UP (ref 0–2)
BASOPHILS NFR BLD AUTO: 0.2 % — SIGNIFICANT CHANGE UP (ref 0–2)
BUN SERPL-MCNC: 19 MG/DL — SIGNIFICANT CHANGE UP (ref 7–23)
BUN SERPL-MCNC: 19 MG/DL — SIGNIFICANT CHANGE UP (ref 7–23)
CALCIUM SERPL-MCNC: 8.9 MG/DL — SIGNIFICANT CHANGE UP (ref 8.5–10.1)
CALCIUM SERPL-MCNC: 8.9 MG/DL — SIGNIFICANT CHANGE UP (ref 8.5–10.1)
CHLORIDE SERPL-SCNC: 107 MMOL/L — SIGNIFICANT CHANGE UP (ref 96–108)
CHLORIDE SERPL-SCNC: 107 MMOL/L — SIGNIFICANT CHANGE UP (ref 96–108)
CO2 SERPL-SCNC: 22 MMOL/L — SIGNIFICANT CHANGE UP (ref 22–31)
CO2 SERPL-SCNC: 22 MMOL/L — SIGNIFICANT CHANGE UP (ref 22–31)
CORTIS AM PEAK SERPL-MCNC: 14 UG/DL — SIGNIFICANT CHANGE UP (ref 6–18.4)
CORTIS AM PEAK SERPL-MCNC: 14 UG/DL — SIGNIFICANT CHANGE UP (ref 6–18.4)
CREAT SERPL-MCNC: 1.3 MG/DL — SIGNIFICANT CHANGE UP (ref 0.5–1.3)
CREAT SERPL-MCNC: 1.3 MG/DL — SIGNIFICANT CHANGE UP (ref 0.5–1.3)
EGFR: 43 ML/MIN/1.73M2 — LOW
EGFR: 43 ML/MIN/1.73M2 — LOW
EOSINOPHIL # BLD AUTO: 0.01 K/UL — SIGNIFICANT CHANGE UP (ref 0–0.5)
EOSINOPHIL # BLD AUTO: 0.01 K/UL — SIGNIFICANT CHANGE UP (ref 0–0.5)
EOSINOPHIL NFR BLD AUTO: 0.1 % — SIGNIFICANT CHANGE UP (ref 0–6)
EOSINOPHIL NFR BLD AUTO: 0.1 % — SIGNIFICANT CHANGE UP (ref 0–6)
ESTIMATED AVERAGE GLUCOSE: 114 MG/DL — SIGNIFICANT CHANGE UP (ref 68–114)
ESTIMATED AVERAGE GLUCOSE: 114 MG/DL — SIGNIFICANT CHANGE UP (ref 68–114)
GLUCOSE SERPL-MCNC: 149 MG/DL — HIGH (ref 70–99)
GLUCOSE SERPL-MCNC: 149 MG/DL — HIGH (ref 70–99)
HCT VFR BLD CALC: 26.2 % — LOW (ref 34.5–45)
HCT VFR BLD CALC: 26.2 % — LOW (ref 34.5–45)
HGB BLD-MCNC: 8.3 G/DL — LOW (ref 11.5–15.5)
HGB BLD-MCNC: 8.3 G/DL — LOW (ref 11.5–15.5)
IMM GRANULOCYTES NFR BLD AUTO: 0.8 % — SIGNIFICANT CHANGE UP (ref 0–0.9)
IMM GRANULOCYTES NFR BLD AUTO: 0.8 % — SIGNIFICANT CHANGE UP (ref 0–0.9)
LYMPHOCYTES # BLD AUTO: 0.97 K/UL — LOW (ref 1–3.3)
LYMPHOCYTES # BLD AUTO: 0.97 K/UL — LOW (ref 1–3.3)
LYMPHOCYTES # BLD AUTO: 7.6 % — LOW (ref 13–44)
LYMPHOCYTES # BLD AUTO: 7.6 % — LOW (ref 13–44)
MCHC RBC-ENTMCNC: 29.4 PG — SIGNIFICANT CHANGE UP (ref 27–34)
MCHC RBC-ENTMCNC: 29.4 PG — SIGNIFICANT CHANGE UP (ref 27–34)
MCHC RBC-ENTMCNC: 31.7 GM/DL — LOW (ref 32–36)
MCHC RBC-ENTMCNC: 31.7 GM/DL — LOW (ref 32–36)
MCV RBC AUTO: 92.9 FL — SIGNIFICANT CHANGE UP (ref 80–100)
MCV RBC AUTO: 92.9 FL — SIGNIFICANT CHANGE UP (ref 80–100)
MONOCYTES # BLD AUTO: 0.78 K/UL — SIGNIFICANT CHANGE UP (ref 0–0.9)
MONOCYTES # BLD AUTO: 0.78 K/UL — SIGNIFICANT CHANGE UP (ref 0–0.9)
MONOCYTES NFR BLD AUTO: 6.1 % — SIGNIFICANT CHANGE UP (ref 2–14)
MONOCYTES NFR BLD AUTO: 6.1 % — SIGNIFICANT CHANGE UP (ref 2–14)
NEUTROPHILS # BLD AUTO: 10.87 K/UL — HIGH (ref 1.8–7.4)
NEUTROPHILS # BLD AUTO: 10.87 K/UL — HIGH (ref 1.8–7.4)
NEUTROPHILS NFR BLD AUTO: 85.2 % — HIGH (ref 43–77)
NEUTROPHILS NFR BLD AUTO: 85.2 % — HIGH (ref 43–77)
PLATELET # BLD AUTO: 203 K/UL — SIGNIFICANT CHANGE UP (ref 150–400)
PLATELET # BLD AUTO: 203 K/UL — SIGNIFICANT CHANGE UP (ref 150–400)
POTASSIUM SERPL-MCNC: 3.5 MMOL/L — SIGNIFICANT CHANGE UP (ref 3.5–5.3)
POTASSIUM SERPL-MCNC: 3.5 MMOL/L — SIGNIFICANT CHANGE UP (ref 3.5–5.3)
POTASSIUM SERPL-SCNC: 3.5 MMOL/L — SIGNIFICANT CHANGE UP (ref 3.5–5.3)
POTASSIUM SERPL-SCNC: 3.5 MMOL/L — SIGNIFICANT CHANGE UP (ref 3.5–5.3)
RBC # BLD: 2.82 M/UL — LOW (ref 3.8–5.2)
RBC # BLD: 2.82 M/UL — LOW (ref 3.8–5.2)
RBC # FLD: 15.8 % — HIGH (ref 10.3–14.5)
RBC # FLD: 15.8 % — HIGH (ref 10.3–14.5)
SODIUM SERPL-SCNC: 136 MMOL/L — SIGNIFICANT CHANGE UP (ref 135–145)
SODIUM SERPL-SCNC: 136 MMOL/L — SIGNIFICANT CHANGE UP (ref 135–145)
WBC # BLD: 12.75 K/UL — HIGH (ref 3.8–10.5)
WBC # BLD: 12.75 K/UL — HIGH (ref 3.8–10.5)
WBC # FLD AUTO: 12.75 K/UL — HIGH (ref 3.8–10.5)
WBC # FLD AUTO: 12.75 K/UL — HIGH (ref 3.8–10.5)

## 2023-12-21 PROCEDURE — 99231 SBSQ HOSP IP/OBS SF/LOW 25: CPT

## 2023-12-21 PROCEDURE — 99232 SBSQ HOSP IP/OBS MODERATE 35: CPT

## 2023-12-21 RX ORDER — LIDOCAINE 4 G/100G
1 CREAM TOPICAL ONCE
Refills: 0 | Status: COMPLETED | OUTPATIENT
Start: 2023-12-21 | End: 2023-12-21

## 2023-12-21 RX ORDER — OXYCODONE HYDROCHLORIDE 5 MG/1
2.5 TABLET ORAL ONCE
Refills: 0 | Status: DISCONTINUED | OUTPATIENT
Start: 2023-12-21 | End: 2023-12-21

## 2023-12-21 RX ADMIN — HEPARIN SODIUM 5000 UNIT(S): 5000 INJECTION INTRAVENOUS; SUBCUTANEOUS at 10:29

## 2023-12-21 RX ADMIN — Medication 81 MILLIGRAM(S): at 10:29

## 2023-12-21 RX ADMIN — LIDOCAINE 1 PATCH: 4 CREAM TOPICAL at 23:07

## 2023-12-21 RX ADMIN — PANTOPRAZOLE SODIUM 40 MILLIGRAM(S): 20 TABLET, DELAYED RELEASE ORAL at 05:04

## 2023-12-21 RX ADMIN — Medication 125 MICROGRAM(S): at 10:30

## 2023-12-21 RX ADMIN — Medication 100 MILLIGRAM(S): at 22:32

## 2023-12-21 RX ADMIN — LIDOCAINE 1 PATCH: 4 CREAM TOPICAL at 09:00

## 2023-12-21 RX ADMIN — OXYCODONE HYDROCHLORIDE 2.5 MILLIGRAM(S): 5 TABLET ORAL at 23:07

## 2023-12-21 RX ADMIN — LIDOCAINE 1 PATCH: 4 CREAM TOPICAL at 07:00

## 2023-12-21 RX ADMIN — Medication 5 MILLIGRAM(S): at 10:30

## 2023-12-21 RX ADMIN — HEPARIN SODIUM 5000 UNIT(S): 5000 INJECTION INTRAVENOUS; SUBCUTANEOUS at 22:31

## 2023-12-21 RX ADMIN — Medication 100 MILLIGRAM(S): at 10:29

## 2023-12-21 RX ADMIN — CEFEPIME 2000 MILLIGRAM(S): 1 INJECTION, POWDER, FOR SOLUTION INTRAMUSCULAR; INTRAVENOUS at 22:31

## 2023-12-21 NOTE — PROGRESS NOTE ADULT - PROBLEM SELECTOR PLAN 1
Pt had 3 sec pause on tele 12/20 AM, asymptomatic   ECG NSR without evidence of AVB. No other episodes on tele thus far.   Per chart review pt has reported hx AVB, declining PPM however pt denies hx cardiac problems. ECG and tele has been otherwise unremarkable.   TTE shows normal LV function no significant valvular disease    No further episodes on tele in 24 hours. Stable from cardiac standpoint.Will sign off. please call back with any questions/concerns.

## 2023-12-21 NOTE — PROGRESS NOTE ADULT - ASSESSMENT
79 yo female with history of LEFT  upper tract metasttic urothelial carcionma to LN bone and liver presently on PADCEV set in for evaluation of hyptension and hypoxia     metastatic urothelial ca   - - now on PADCEV   - followed by Dr. Marvin [facility] [place]   - patient had been scheduled for next cycle on day of admission and treatment was held     Hypotension   -BP improved   - would check cortisol , ACTH     PNA   - now on CEFEPIME and VANCO D/C'd   - now on Doxy   - CT reveiwed and suggestive of LEFT Middle PNA   - continue with antibiotics   - ID consult appreciated     -  79 yo female with history of LEFT  upper tract metasttic urothelial carcionma to LN bone and liver presently on PADCEV set in for evaluation of hyptension and hypoxia     metastatic urothelial ca   - - now on PADCEV   - followed by Dr. Marvin [facility] [place]   - patient had been scheduled for next cycle on day of admission and treatment was held     Hypotension   -BP improved   - would check cortisol , ACTH     PNA   - now on CEFEPIME and VANCO D/C'd   - now on Doxy   - CT reveiwed and suggestive of LEFT Middle PNA   - continue with antibiotics   - ID consult appreciated     -

## 2023-12-21 NOTE — PROGRESS NOTE ADULT - SUBJECTIVE AND OBJECTIVE BOX
CHIEF COMPLAINT:       HPI:  77 yo female with a hx of metastatic urothelial carcinoma (on chemo), CVA, osteoarthritis, IBS, hypothyroidism. 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 Pt denies recent SOB, cough, fevers/chills. Pt admitted for multifocal pneumonia.     Pt noted to have pause on tele for 3 seconds for which cardiology was consulted. She did not have any symptoms at the time. Per chart, pt has hx AVB for which she declined PPM. However, pt denies hx cardiac problems. She has followed with cardiologist, Dr. Magdiel Smith (cardio-oncology) for preventive measures. She denies chest pain, SOB, exertional angina or dyspnea, syncope. She does note general lightheadedness since undergoing cancer treatment but none specifically while admitted.     ECG shows NSR 93 bpm, normal intervals, no conduction delays or ST-T wave changes.     12/21/23: no further episodes of pause or braydcardia on tele. Pt asymptomatic from cardiac standpoint.       PSH: Cholecystectomy     Social Hx: Former smoker 35 years ago (1ppd x 20 years), etoh / drugs - Denies     Family Hx: Brother - lung cancer  (20 Dec 2023 02:40)      PAST MEDICAL / SURGICAL HISTORY:  PAST MEDICAL & SURGICAL HISTORY:  Urothelial carcinoma of kidney          SOCIAL HISTORY:   Alcohol: Denied  Smoking: Nonsmoker  Drug Use: Denied  Marital Status:     FAMILY HISTORY: FAMILY HISTORY:      MEDICATIONS  (STANDING):  aspirin enteric coated 81 milliGRAM(s) Oral daily  cefepime  Injectable. 2000 milliGRAM(s) IV Push <User Schedule>  doxycycline monohydrate Capsule 100 milliGRAM(s) Oral every 12 hours  heparin   Injectable 5000 Unit(s) SubCutaneous every 12 hours  hydrocortisone sodium succinate Injectable 125 milliGRAM(s) IV Push once  levothyroxine 125 MICROGram(s) Oral daily  pantoprazole    Tablet 40 milliGRAM(s) Oral before breakfast  predniSONE   Tablet 5 milliGRAM(s) Oral daily    MEDICATIONS  (PRN):  acetaminophen     Tablet .. 650 milliGRAM(s) Oral every 4 hours PRN Mild Pain (1 - 3)      Allergies    No Known Allergies    Intolerances    VITAL SIGNS:   Vital Signs Last 24 Hrs  T(C): 36.6 (20 Dec 2023 07:36), Max: 37.3 (19 Dec 2023 19:23)  T(F): 97.9 (20 Dec 2023 07:36), Max: 99.1 (19 Dec 2023 19:23)  HR: 76 (20 Dec 2023 12:12) (73 - 96)  BP: 83/55 (20 Dec 2023 12:12) (80/65 - 110/95)  BP(mean): 64 (20 Dec 2023 12:12) (64 - 80)  RR: 16 (20 Dec 2023 07:36) (12 - 22)  SpO2: 92% (20 Dec 2023 07:36) (90% - 98%)    Parameters below as of 20 Dec 2023 07:36  Patient On (Oxygen Delivery Method): room air        I&O's Summary      PHYSICAL EXAM:  Constitutional: NAD, awake and alert  HEENT:  EOMI,  Pupils round, No oral cyanosis.  Pulmonary: Non-labored, breath sounds are clear bilaterally, No wheezing, rales or rhonchi  Cardiovascular: S1 and S2, regular rate and rhythm, no Murmurs, gallops or rubs  Gastrointestinal: Bowel Sounds present, soft, nontender.   Lymph: No peripheral edema. No cervical lymphadenopathy.  Neurological: Alert, no focal deficits  Skin: No rashes.  Psych:  Mood & affect appropriate    LABS:                        7.5    14.81 )-----------( 175      ( 20 Dec 2023 07:11 )             24.1                         8.1    13.84 )-----------( 174      ( 19 Dec 2023 18:51 )             25.5     20 Dec 2023 07:11    137    |  109    |  25     ----------------------------<  99     3.6     |  23     |  1.41   19 Dec 2023 18:51    132    |  101    |  29     ----------------------------<  176    3.9     |  26     |  1.67     Ca    7.7        20 Dec 2023 07:11  Ca    7.9        19 Dec 2023 18:51    TPro  5.1    /  Alb  1.8    /  TBili  0.3    /  DBili  x      /  AST  18     /  ALT  14     /  AlkPhos  133    20 Dec 2023 07:11  TPro  5.6    /  Alb  2.1    /  TBili  0.6    /  DBili  x      /  AST  21     /  ALT  18     /  AlkPhos  164    19 Dec 2023 18:51    PT/INR - ( 20 Dec 2023 07:11 )   PT: 12.2 sec;   INR: 1.08 ratio         PTT - ( 20 Dec 2023 07:11 )  PTT:29.4 sec        12-20 @ 10:21  TSH: 9.84    TTE 12/20/23  mpression     Summary     The mitral valve was well visualized.   The mitral valve leaflets appear thin and normal.   Trace mitral regurgitation is present.   The aortic valve is well visualized, appears mildly calcified. Valve   opening seems to be normal.   The tricuspid valve leaflets are thin and pliable; valve motion is   normal.   Mild (1+) tricuspid valve regurgitation is present.   Normal appearing pulmonic valve structure.   Mild pulmonic valvular regurgitation (1+) is present.   Normal appearing left atrium.   The left ventricle is normal in size, wall thickness, wall motion and   contractility as seen in limited views.   Estimated left ventricular ejection fraction is 60-65 %.     Signature     ----------------------------------------------------------------   Electronically signed by Allyssa Shelby MD(Interpreting   physician) on 12/20/2023 01:41 PM   ----------------------------------------------------------------

## 2023-12-21 NOTE — PROGRESS NOTE ADULT - SUBJECTIVE AND OBJECTIVE BOX
Patient seen and examined  feels well  denies any complaints  bp better systolic > 100  cortisol level sent  on abx for pna  appreciate heme/ID/Cards consult  left sided adrenal mass noted on sono  blood and urine are negative    Review of Systems:  General:denies fever chills, headache, weakness  HEENT: denies blurry vision,diffculty swallowing, difficulty hearing, tinnitus  Cardiovascular: denies chest pain  ,palpitations  Pulmonary:denies shortness of breath, cough, wheezing, hemoptysis  Gastrointestinal: denies abdominal pain, constipation, diarrhea,nausea , vomiting, hematochezia  : denies hematuria, dysuria, or incontinence  Neurological: denies weakness, numbness , tingling, dizziness, tremors  MSK: denies muscle pain, difficulty ambulating, swelling, back pain  skin: denies skin rash, itching, burning, or  skin lesions  Psychiatrical: denies mood disturbances, anxierty, feeling depressed, depression , or difficulty sleeping    Objective:  Vitals  T(C): 36.9 (12-21-23 @ 07:27), Max: 36.9 (12-20-23 @ 20:20)  HR: 83 (12-21-23 @ 07:27) (63 - 83)  BP: 115/71 (12-21-23 @ 07:27) (101/65 - 119/61)  RR: 17 (12-21-23 @ 07:27) (16 - 17)  SpO2: 92% (12-21-23 @ 07:27) (92% - 92%)    Physical Exam:  General: comfortable, no acute distress  HEENT: Atraumatic, no LAD, trachea midline, PERRLA  Cardiovascular: normal s1s2, no murmurs, gallops or fricition rubs  Pulmonary: clear to ausculation Bilaterally, no wheezing , rhonchi  Gastrointestinal: soft non tender non distended, no masses felt, no organomegally  Muscloskeletal: no lower extremity edema, intact bilateral lower extremity pulses  Neurological: CN II-12 intact. No focal weakness  Psychiatrical: normal mood, cooperative  SKIN: no rash, lesions or ulcers    Labs:                          8.3    12.75 )-----------( 203      ( 21 Dec 2023 10:53 )             26.2     12-21    136  |  107  |  19  ----------------------------<  149<H>  3.5   |  22  |  1.30    Ca    8.9      21 Dec 2023 09:27    TPro  5.1<L>  /  Alb  1.8<L>  /  TBili  0.3  /  DBili  x   /  AST  18  /  ALT  14  /  AlkPhos  133<H>  12-20    LIVER FUNCTIONS - ( 20 Dec 2023 07:11 )  Alb: 1.8 g/dL / Pro: 5.1 gm/dL / ALK PHOS: 133 U/L / ALT: 14 U/L / AST: 18 U/L / GGT: x           PT/INR - ( 20 Dec 2023 07:11 )   PT: 12.2 sec;   INR: 1.08 ratio         PTT - ( 20 Dec 2023 07:11 )  PTT:29.4 sec      Active Medications  MEDICATIONS  (STANDING):  aspirin enteric coated 81 milliGRAM(s) Oral daily  cefepime  Injectable. 2000 milliGRAM(s) IV Push <User Schedule>  doxycycline monohydrate Capsule 100 milliGRAM(s) Oral every 12 hours  heparin   Injectable 5000 Unit(s) SubCutaneous every 12 hours  levothyroxine 125 MICROGram(s) Oral daily  pantoprazole    Tablet 40 milliGRAM(s) Oral before breakfast  predniSONE   Tablet 5 milliGRAM(s) Oral daily    MEDICATIONS  (PRN):  acetaminophen     Tablet .. 650 milliGRAM(s) Oral every 4 hours PRN Mild Pain (1 - 3)

## 2023-12-21 NOTE — DISCHARGE NOTE NURSING/CASE MANAGEMENT/SOCIAL WORK - PATIENT PORTAL LINK FT
You can access the FollowMyHealth Patient Portal offered by [facility] by registering at the following website: [email or web address]. By joining [employer]’s FollowMyHealth portal, you will also be able to view your health information using other applications (apps) compatible with our system. You can access the FollowMyHealth Patient Portal offered by [facility] by registering at the following website: [email or web address]. By joining [employer]’s FollowMyHealth portal, you will also be able to view your health information using other applications (apps) compatible with our system.

## 2023-12-21 NOTE — PROGRESS NOTE ADULT - ASSESSMENT
79 y/o F w/ PMH of urothelial carcinoma w/ mets (on chemo), CVA, osteoarthritis, IBS, hypothyroidism. 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?), 2nd degree AV block (patient declined PPM), p/w hypotension    Septic shock 2/2 HCAP (suspect gram negative PNA)  Despite IV bolus mido and IV abx, pressure is low  Lactate = 2   COVID / Flu negative   ID and Heme consulted  Blood cultures are negative  Thyroid panel benign  CT chest with atypical pna /  US ++ left adrenal mass  plan:  Heme.ID Consulted  check juan and acth  abx as per ID    Adrenal mass left:  check juan and acth    Urothelial carcinoma / Anemia   -F/u heme/onc     SAILAJA prerenal  -Renal U/S     H/o 2nd degree heart block  3 second pause on tele  Patient previously declined PPM  cardiology consulted; no acute intervention    *Advance Directives  -Patient's primary HCP is her son Yoan     *DVT ppx   -Heparin SubQ and trend H/H closely

## 2023-12-21 NOTE — DISCHARGE NOTE NURSING/CASE MANAGEMENT/SOCIAL WORK - NSDCFUADDAPPT_GEN_ALL_CORE_FT
[facility]  DR. Gruber  Date: 12/29/2023  Day: Friday  Time: 2:15pm   Phone: [phone] [facility]  DR. Gruber  Date: 12/29/2023  Day: Friday  Time: 2:15pm   Phone: [phone]

## 2023-12-21 NOTE — PROGRESS NOTE ADULT - SUBJECTIVE AND OBJECTIVE BOX
Date of service: 12-21-23 @ 10:01    pt seen and examined   feels better  no resp distress  no fevers     ROS: no fever or chills; denies dizziness, no HA, no abdominal pain, no diarrhea or constipation; no dysuria, no urinary frequency, no legs pain, no rashes    MEDICATIONS  (STANDING):  aspirin enteric coated 81 milliGRAM(s) Oral daily  cefepime  Injectable. 2000 milliGRAM(s) IV Push <User Schedule>  doxycycline monohydrate Capsule 100 milliGRAM(s) Oral every 12 hours  heparin   Injectable 5000 Unit(s) SubCutaneous every 12 hours  levothyroxine 125 MICROGram(s) Oral daily  pantoprazole    Tablet 40 milliGRAM(s) Oral before breakfast  predniSONE   Tablet 5 milliGRAM(s) Oral daily      Vital Signs Last 24 Hrs  T(C): 36.9 (21 Dec 2023 07:27), Max: 36.9 (20 Dec 2023 20:20)  T(F): 98.4 (21 Dec 2023 07:27), Max: 98.4 (20 Dec 2023 20:20)  HR: 83 (21 Dec 2023 07:27) (63 - 83)  BP: 115/71 (21 Dec 2023 07:27) (83/55 - 119/61)  BP(mean): 76 (20 Dec 2023 15:26) (64 - 76)  RR: 17 (21 Dec 2023 07:27) (16 - 17)  SpO2: 92% (21 Dec 2023 07:27) (92% - 92%)    Parameters below as of 21 Dec 2023 07:27  Patient On (Oxygen Delivery Method): room air      PE:  Constitutional: NAD   HEENT: NC/AT, EOMI, PERRLA, conjunctivae clear; ears and nose atraumatic; pharynx benign  Neck: supple; thyroid not palpable  Back: no tenderness  Respiratory: decreased breath sounds  Cardiovascular: S1S2 regular, no murmurs  Abdomen: soft, not tender, not distended, positive BS; liver and spleen WNL  Genitourinary: no suprapubic tenderness  Lymphatic: no LN palpable  Musculoskeletal: no muscle tenderness, no joint swelling or tenderness  Extremities: no pedal edema  Neurological/ Psychiatric: AxOx3, Judgement and insight normal;  moving all extremities  Skin: no rashes; no palpable lesions    Labs: all available labs reviewed                                   7.5    14.81 )-----------( 175      ( 20 Dec 2023 07:11 )             24.1     12-20    137  |  109<H>  |  25<H>  ----------------------------<  99  3.6   |  23  |  1.41<H>    Ca    7.7<L>      20 Dec 2023 07:11    TPro  5.1<L>  /  Alb  1.8<L>  /  TBili  0.3  /  DBili  x   /  AST  18  /  ALT  14  /  AlkPhos  133<H>  12-20          Urinalysis Basic - ( 20 Dec 2023 07:11 )    Color: x / Appearance: x / SG: x / pH: x  Gluc: 99 mg/dL / Ketone: x  / Bili: x / Urobili: x   Blood: x / Protein: x / Nitrite: x   Leuk Esterase: x / RBC: x / WBC x   Sq Epi: x / Non Sq Epi: x / Bacteria: x    Culture - Urine (12.19.23 @ 19:50)   Specimen Source: Clean Catch None  Culture Results:   <10,000 CFU/mL Normal Urogenital Jacquelyn  Culture - Blood (12.19.23 @ 18:51)   Specimen Source: .Blood Blood-Peripheral-aerobic plus recv'd  Culture Results:   No growth at 24 hours  Culture - Blood (12.19.23 @ 18:51)   Specimen Source: .Blood Blood-Peripheral-aerobic plus recv'd  Culture Results:   No growth at 24 hours    Radiology: all available radiological tests reviewed      ACC: [id number] EXAM:  XR CHEST PORTABLE IMMED 1V   ORDERED BY: MARIUM CORRALES     PROCEDURE DATE:  12/19/2023          INTERPRETATION:  TIME OF EXAM: December 19, 2023 at 6:59 PM.    CLINICAL INFORMATION: Sepsis.    COMPARISON:  April 26, 2023.    TECHNIQUE:   AP Portable chest x-ray.    INTERPRETATION:    Heart size is not accurately evaluated on this image.  There is an accessed CT injectable right IJ approach port with tip in the   SVC.  The thoracic aorta is calcified.  There is new biapical opacities. Question overlying soft tissues, pleural   thickening, or apical caps of pleural fluid.  There are new patchy left mid and lower lung opacities.  There is no right pleural effusion.  There is a trace left pleural effusion.  No pneumothorax is seen.  There is scoliosis of the spine with osteoarthritic degenerative change.      IMPRESSION:  New biapical opacities. Question overlying soft tissues,   pleural thickening, or apical caps of pleural fluid.    New patchy left mid and lower lung opacities, of indeterminate nature,   but which may be due to pneumonia. Suggest short-term follow-up chest   x-ray to evaluate for resolution and exclude other possible etiologies.      Advanced directives addressed: full resuscitation Date of service: 12-21-23 @ 10:01    pt seen and examined   feels better  no resp distress  no fevers     ROS: no fever or chills; denies dizziness, no HA, no abdominal pain, no diarrhea or constipation; no dysuria, no urinary frequency, no legs pain, no rashes    MEDICATIONS  (STANDING):  aspirin enteric coated 81 milliGRAM(s) Oral daily  cefepime  Injectable. 2000 milliGRAM(s) IV Push <User Schedule>  doxycycline monohydrate Capsule 100 milliGRAM(s) Oral every 12 hours  heparin   Injectable 5000 Unit(s) SubCutaneous every 12 hours  levothyroxine 125 MICROGram(s) Oral daily  pantoprazole    Tablet 40 milliGRAM(s) Oral before breakfast  predniSONE   Tablet 5 milliGRAM(s) Oral daily      Vital Signs Last 24 Hrs  T(C): 36.9 (21 Dec 2023 07:27), Max: 36.9 (20 Dec 2023 20:20)  T(F): 98.4 (21 Dec 2023 07:27), Max: 98.4 (20 Dec 2023 20:20)  HR: 83 (21 Dec 2023 07:27) (63 - 83)  BP: 115/71 (21 Dec 2023 07:27) (83/55 - 119/61)  BP(mean): 76 (20 Dec 2023 15:26) (64 - 76)  RR: 17 (21 Dec 2023 07:27) (16 - 17)  SpO2: 92% (21 Dec 2023 07:27) (92% - 92%)    Parameters below as of 21 Dec 2023 07:27  Patient On (Oxygen Delivery Method): room air      PE:  Constitutional: NAD   HEENT: NC/AT, EOMI, PERRLA, conjunctivae clear; ears and nose atraumatic; pharynx benign  Neck: supple; thyroid not palpable  Back: no tenderness  Respiratory: decreased breath sounds  Cardiovascular: S1S2 regular, no murmurs  Abdomen: soft, not tender, not distended, positive BS; liver and spleen WNL  Genitourinary: no suprapubic tenderness  Lymphatic: no LN palpable  Musculoskeletal: no muscle tenderness, no joint swelling or tenderness  Extremities: no pedal edema  Neurological/ Psychiatric: AxOx3, Judgement and insight normal;  moving all extremities  Skin: no rashes; no palpable lesions    Labs: all available labs reviewed                                   7.5    14.81 )-----------( 175      ( 20 Dec 2023 07:11 )             24.1     12-20    137  |  109<H>  |  25<H>  ----------------------------<  99  3.6   |  23  |  1.41<H>    Ca    7.7<L>      20 Dec 2023 07:11    TPro  5.1<L>  /  Alb  1.8<L>  /  TBili  0.3  /  DBili  x   /  AST  18  /  ALT  14  /  AlkPhos  133<H>  12-20          Urinalysis Basic - ( 20 Dec 2023 07:11 )    Color: x / Appearance: x / SG: x / pH: x  Gluc: 99 mg/dL / Ketone: x  / Bili: x / Urobili: x   Blood: x / Protein: x / Nitrite: x   Leuk Esterase: x / RBC: x / WBC x   Sq Epi: x / Non Sq Epi: x / Bacteria: x    Culture - Urine (12.19.23 @ 19:50)   Specimen Source: Clean Catch None  Culture Results:   <10,000 CFU/mL Normal Urogenital Jacquelyn  Culture - Blood (12.19.23 @ 18:51)   Specimen Source: .Blood Blood-Peripheral-aerobic plus recv'd  Culture Results:   No growth at 24 hours  Culture - Blood (12.19.23 @ 18:51)   Specimen Source: .Blood Blood-Peripheral-aerobic plus recv'd  Culture Results:   No growth at 24 hours    Radiology: all available radiological tests reviewed      ACC: [id number] EXAM:  XR CHEST PORTABLE IMMED 1V   ORDERED BY: MARIUM CORRALES     PROCEDURE DATE:  12/19/2023          INTERPRETATION:  TIME OF EXAM: December 19, 2023 at 6:59 PM.    CLINICAL INFORMATION: Sepsis.    COMPARISON:  April 26, 2023.    TECHNIQUE:   AP Portable chest x-ray.    INTERPRETATION:    Heart size is not accurately evaluated on this image.  There is an accessed CT injectable right IJ approach port with tip in the   SVC.  The thoracic aorta is calcified.  There is new biapical opacities. Question overlying soft tissues, pleural   thickening, or apical caps of pleural fluid.  There are new patchy left mid and lower lung opacities.  There is no right pleural effusion.  There is a trace left pleural effusion.  No pneumothorax is seen.  There is scoliosis of the spine with osteoarthritic degenerative change.      IMPRESSION:  New biapical opacities. Question overlying soft tissues,   pleural thickening, or apical caps of pleural fluid.    New patchy left mid and lower lung opacities, of indeterminate nature,   but which may be due to pneumonia. Suggest short-term follow-up chest   x-ray to evaluate for resolution and exclude other possible etiologies.      Advanced directives addressed: full resuscitation

## 2023-12-21 NOTE — PROGRESS NOTE ADULT - NUTRITIONAL ASSESSMENT
This patient has been assessed with a concern for Malnutrition and has been determined to have a diagnosis/diagnoses of Severe protein-calorie malnutrition.    This patient is being managed with:   Diet Regular-  Supplement Feeding Modality:  Oral  Ensure Plant-Based Cans or Servings Per Day:  1       Frequency:  Two Times a day  Entered: Dec 20 2023  3:03PM    Diet Regular-  Entered: Dec 20 2023  3:48AM    The following pending diet order is being considered for treatment of Severe protein-calorie malnutrition:null

## 2023-12-21 NOTE — DISCHARGE NOTE NURSING/CASE MANAGEMENT/SOCIAL WORK - NSDCPEFALRISK_GEN_ALL_CORE
For information on Fall & Injury Prevention, visit: [email or web address] OR  [email or web address] OR  https://www.cdc.gov/steadi/patient.html For information on Fall & Injury Prevention, visit: [email or web address] OR  [email or web address]/[email or web address]-injury OR  https://www.cdc.gov/steadi/patient.html

## 2023-12-21 NOTE — PROGRESS NOTE ADULT - SUBJECTIVE AND OBJECTIVE BOX
INTERVAL HPI/OVERNIGHT EVENTS:  Patient S&E at bedside. No o/n events, patient resting comfortably.  patient afebrile and BP improving       VITAL SIGNS:  T(F): 98.2 (12-21-23 @ 21:17)  HR: 80 (12-21-23 @ 21:17)  BP: 154/86 (12-21-23 @ 21:17)  RR: 18 (12-21-23 @ 21:17)  SpO2: 95% (12-21-23 @ 21:17)  Wt(kg): --    PHYSICAL EXAM:    Constitutional: NAD  Eyes: EOMI, sclera non-icteric  Neck: supple, no masses, no JVD  Respiratory: CTA b/l, good air entry b/l  Cardiovascular: RRR, no M/R/G  Gastrointestinal: soft, NTND, no masses palpable, + BS, no hepatosplenomegaly  Extremities: no c/c/e  Neurological: AAOx3      MEDICATIONS  (STANDING):  aspirin enteric coated 81 milliGRAM(s) Oral daily  cefepime  Injectable. 2000 milliGRAM(s) IV Push <User Schedule>  doxycycline monohydrate Capsule 100 milliGRAM(s) Oral every 12 hours  heparin   Injectable 5000 Unit(s) SubCutaneous every 12 hours  levothyroxine 125 MICROGram(s) Oral daily  pantoprazole    Tablet 40 milliGRAM(s) Oral before breakfast  predniSONE   Tablet 5 milliGRAM(s) Oral daily    MEDICATIONS  (PRN):  acetaminophen     Tablet .. 650 milliGRAM(s) Oral every 4 hours PRN Mild Pain (1 - 3)      Allergies    No Known Allergies    Intolerances        LABS:                        8.3    12.75 )-----------( 203      ( 21 Dec 2023 10:53 )             26.2     12-21    136  |  107  |  19  ----------------------------<  149<H>  3.5   |  22  |  1.30    Ca    8.9      21 Dec 2023 09:27    TPro  5.1<L>  /  Alb  1.8<L>  /  TBili  0.3  /  DBili  x   /  AST  18  /  ALT  14  /  AlkPhos  133<H>  12-20    PT/INR - ( 20 Dec 2023 07:11 )   PT: 12.2 sec;   INR: 1.08 ratio         PTT - ( 20 Dec 2023 07:11 )  PTT:29.4 sec  Urinalysis Basic - ( 21 Dec 2023 09:27 )    Color: x / Appearance: x / SG: x / pH: x  Gluc: 149 mg/dL / Ketone: x  / Bili: x / Urobili: x   Blood: x / Protein: x / Nitrite: x   Leuk Esterase: x / RBC: x / WBC x   Sq Epi: x / Non Sq Epi: x / Bacteria: x        RADIOLOGY & ADDITIONAL TESTS:  Studies reviewed.    ASSESSMENT & PLAN:

## 2023-12-21 NOTE — PROGRESS NOTE ADULT - ASSESSMENT
79 y/o F w/ PMH of urothelial carcinoma w/ mets (on chemo???), CVA, osteoarthritis, IBS, hypothyroidism. 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 Patient was at [facility] [place] and found to have BP of 70/40 and was also told she had pneumonia on CT scan and was referred to ED for further evaluation. Patient denies cough, runny nose, sore throat, fever, chills. Her only complaint is back pain 2/2 malignancy. Imaging remarkable for New biapical opacities. Question overlying soft tissues, pleural thickening, or apical caps of pleural fluid. New patchy left mid and lower lung opacities, of indeterminate nature, but which may be due to pneumonia. Started on IV abx - vancomycin/cefepime.     1. Hypotension. Multifocal pneumonia. Metastatic urothelial cancer on immunotherapy. Immunocompromised host  - imaging reviewed, hd stable, slowly improving   - on cefepime 2gmq daily 2-3  - on po doxycycline 100mg BID #2   - continue with abx coverage  - f/u cultures - no growth  - monitor temps  - tolerating abx well so far; no side effects noted  - reason for abx use and side effects reviewed with patient  - oncology f/u noted  - supportive care  - fu cbc    2. other issues - care per medicine  79 y/o F w/ PMH of urothelial carcinoma w/ mets (on chemo???), CVA, osteoarthritis, IBS, hypothyroidism. 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 [place] [facility] [place] found to have BP of 70/40 and was also told she had pneumonia on CT scan and was referred to ED for further evaluation. Patient denies cough, runny nose, sore throat, fever, chills. Her only complaint is back pain 2/2 malignancy. Imaging remarkable for New biapical opacities. Question overlying soft tissues, pleural thickening, or apical caps of pleural fluid. New patchy left mid and lower lung opacities, of indeterminate nature, but which may be due to pneumonia. Started on IV abx - vancomycin/cefepime.     1. Hypotension. Multifocal pneumonia. Metastatic urothelial cancer on immunotherapy. Immunocompromised host  - imaging reviewed, hd stable, slowly improving   - on cefepime 2gmq daily 2-3  - on po doxycycline 100mg BID #2   - continue with abx coverage  - f/u cultures - no growth  - monitor temps  - tolerating abx well so far; no side effects noted  - reason for abx use and side effects reviewed with patient  - oncology f/u noted  - supportive care  - fu cbc    2. other issues - care per medicine

## 2023-12-22 ENCOUNTER — TRANSCRIPTION ENCOUNTER (OUTPATIENT)
Age: 78
End: 2023-12-22

## 2023-12-22 VITALS
SYSTOLIC BLOOD PRESSURE: 135 MMHG | RESPIRATION RATE: 18 BRPM | HEART RATE: 81 BPM | TEMPERATURE: 98 F | OXYGEN SATURATION: 96 % | DIASTOLIC BLOOD PRESSURE: 65 MMHG

## 2023-12-22 LAB
ACTH SER-ACNC: 4.4 PG/ML — LOW (ref 7.2–63.3)
ACTH SER-ACNC: 4.4 PG/ML — LOW (ref 7.2–63.3)
ANION GAP SERPL CALC-SCNC: 4 MMOL/L — LOW (ref 5–17)
ANION GAP SERPL CALC-SCNC: 4 MMOL/L — LOW (ref 5–17)
BASOPHILS # BLD AUTO: 0.04 K/UL — SIGNIFICANT CHANGE UP (ref 0–0.2)
BASOPHILS # BLD AUTO: 0.04 K/UL — SIGNIFICANT CHANGE UP (ref 0–0.2)
BASOPHILS NFR BLD AUTO: 0.4 % — SIGNIFICANT CHANGE UP (ref 0–2)
BASOPHILS NFR BLD AUTO: 0.4 % — SIGNIFICANT CHANGE UP (ref 0–2)
BLD GP AB SCN SERPL QL: SIGNIFICANT CHANGE UP
BLD GP AB SCN SERPL QL: SIGNIFICANT CHANGE UP
BUN SERPL-MCNC: 13 MG/DL — SIGNIFICANT CHANGE UP (ref 7–23)
BUN SERPL-MCNC: 13 MG/DL — SIGNIFICANT CHANGE UP (ref 7–23)
CALCIUM SERPL-MCNC: 8.9 MG/DL — SIGNIFICANT CHANGE UP (ref 8.5–10.1)
CALCIUM SERPL-MCNC: 8.9 MG/DL — SIGNIFICANT CHANGE UP (ref 8.5–10.1)
CHLORIDE SERPL-SCNC: 107 MMOL/L — SIGNIFICANT CHANGE UP (ref 96–108)
CHLORIDE SERPL-SCNC: 107 MMOL/L — SIGNIFICANT CHANGE UP (ref 96–108)
CO2 SERPL-SCNC: 26 MMOL/L — SIGNIFICANT CHANGE UP (ref 22–31)
CO2 SERPL-SCNC: 26 MMOL/L — SIGNIFICANT CHANGE UP (ref 22–31)
CREAT SERPL-MCNC: 1.15 MG/DL — SIGNIFICANT CHANGE UP (ref 0.5–1.3)
CREAT SERPL-MCNC: 1.15 MG/DL — SIGNIFICANT CHANGE UP (ref 0.5–1.3)
EGFR: 49 ML/MIN/1.73M2 — LOW
EGFR: 49 ML/MIN/1.73M2 — LOW
EOSINOPHIL # BLD AUTO: 0.06 K/UL — SIGNIFICANT CHANGE UP (ref 0–0.5)
EOSINOPHIL # BLD AUTO: 0.06 K/UL — SIGNIFICANT CHANGE UP (ref 0–0.5)
EOSINOPHIL NFR BLD AUTO: 0.6 % — SIGNIFICANT CHANGE UP (ref 0–6)
EOSINOPHIL NFR BLD AUTO: 0.6 % — SIGNIFICANT CHANGE UP (ref 0–6)
GLUCOSE SERPL-MCNC: 75 MG/DL — SIGNIFICANT CHANGE UP (ref 70–99)
GLUCOSE SERPL-MCNC: 75 MG/DL — SIGNIFICANT CHANGE UP (ref 70–99)
HCT VFR BLD CALC: 24.6 % — LOW (ref 34.5–45)
HCT VFR BLD CALC: 24.6 % — LOW (ref 34.5–45)
HGB BLD-MCNC: 7.9 G/DL — LOW (ref 11.5–15.5)
HGB BLD-MCNC: 7.9 G/DL — LOW (ref 11.5–15.5)
IMM GRANULOCYTES NFR BLD AUTO: 1.4 % — HIGH (ref 0–0.9)
IMM GRANULOCYTES NFR BLD AUTO: 1.4 % — HIGH (ref 0–0.9)
LYMPHOCYTES # BLD AUTO: 1.19 K/UL — SIGNIFICANT CHANGE UP (ref 1–3.3)
LYMPHOCYTES # BLD AUTO: 1.19 K/UL — SIGNIFICANT CHANGE UP (ref 1–3.3)
LYMPHOCYTES # BLD AUTO: 12.8 % — LOW (ref 13–44)
LYMPHOCYTES # BLD AUTO: 12.8 % — LOW (ref 13–44)
MCHC RBC-ENTMCNC: 28.9 PG — SIGNIFICANT CHANGE UP (ref 27–34)
MCHC RBC-ENTMCNC: 28.9 PG — SIGNIFICANT CHANGE UP (ref 27–34)
MCHC RBC-ENTMCNC: 32.1 GM/DL — SIGNIFICANT CHANGE UP (ref 32–36)
MCHC RBC-ENTMCNC: 32.1 GM/DL — SIGNIFICANT CHANGE UP (ref 32–36)
MCV RBC AUTO: 90.1 FL — SIGNIFICANT CHANGE UP (ref 80–100)
MCV RBC AUTO: 90.1 FL — SIGNIFICANT CHANGE UP (ref 80–100)
MONOCYTES # BLD AUTO: 0.66 K/UL — SIGNIFICANT CHANGE UP (ref 0–0.9)
MONOCYTES # BLD AUTO: 0.66 K/UL — SIGNIFICANT CHANGE UP (ref 0–0.9)
MONOCYTES NFR BLD AUTO: 7.1 % — SIGNIFICANT CHANGE UP (ref 2–14)
MONOCYTES NFR BLD AUTO: 7.1 % — SIGNIFICANT CHANGE UP (ref 2–14)
NEUTROPHILS # BLD AUTO: 7.24 K/UL — SIGNIFICANT CHANGE UP (ref 1.8–7.4)
NEUTROPHILS # BLD AUTO: 7.24 K/UL — SIGNIFICANT CHANGE UP (ref 1.8–7.4)
NEUTROPHILS NFR BLD AUTO: 77.7 % — HIGH (ref 43–77)
NEUTROPHILS NFR BLD AUTO: 77.7 % — HIGH (ref 43–77)
PLATELET # BLD AUTO: 197 K/UL — SIGNIFICANT CHANGE UP (ref 150–400)
PLATELET # BLD AUTO: 197 K/UL — SIGNIFICANT CHANGE UP (ref 150–400)
POTASSIUM SERPL-MCNC: 3.3 MMOL/L — LOW (ref 3.5–5.3)
POTASSIUM SERPL-MCNC: 3.3 MMOL/L — LOW (ref 3.5–5.3)
POTASSIUM SERPL-SCNC: 3.3 MMOL/L — LOW (ref 3.5–5.3)
POTASSIUM SERPL-SCNC: 3.3 MMOL/L — LOW (ref 3.5–5.3)
RBC # BLD: 2.73 M/UL — LOW (ref 3.8–5.2)
RBC # BLD: 2.73 M/UL — LOW (ref 3.8–5.2)
RBC # FLD: 15.9 % — HIGH (ref 10.3–14.5)
RBC # FLD: 15.9 % — HIGH (ref 10.3–14.5)
SODIUM SERPL-SCNC: 137 MMOL/L — SIGNIFICANT CHANGE UP (ref 135–145)
SODIUM SERPL-SCNC: 137 MMOL/L — SIGNIFICANT CHANGE UP (ref 135–145)
WBC # BLD: 9.32 K/UL — SIGNIFICANT CHANGE UP (ref 3.8–10.5)
WBC # BLD: 9.32 K/UL — SIGNIFICANT CHANGE UP (ref 3.8–10.5)
WBC # FLD AUTO: 9.32 K/UL — SIGNIFICANT CHANGE UP (ref 3.8–10.5)
WBC # FLD AUTO: 9.32 K/UL — SIGNIFICANT CHANGE UP (ref 3.8–10.5)

## 2023-12-22 PROCEDURE — 99239 HOSP IP/OBS DSCHRG MGMT >30: CPT

## 2023-12-22 RX ORDER — CEFEPIME 1 G/1
1000 INJECTION, POWDER, FOR SOLUTION INTRAMUSCULAR; INTRAVENOUS EVERY 12 HOURS
Refills: 0 | Status: DISCONTINUED | OUTPATIENT
Start: 2023-12-22 | End: 2023-12-22

## 2023-12-22 RX ORDER — OXYCODONE HYDROCHLORIDE 5 MG/1
20 TABLET ORAL EVERY 12 HOURS
Refills: 0 | Status: DISCONTINUED | OUTPATIENT
Start: 2023-12-22 | End: 2023-12-22

## 2023-12-22 RX ORDER — CEFUROXIME AXETIL 250 MG
1 TABLET ORAL
Qty: 10 | Refills: 0
Start: 2023-12-22 | End: 2023-12-26

## 2023-12-22 RX ADMIN — Medication 650 MILLIGRAM(S): at 01:38

## 2023-12-22 RX ADMIN — OXYCODONE HYDROCHLORIDE 20 MILLIGRAM(S): 5 TABLET ORAL at 12:38

## 2023-12-22 RX ADMIN — HEPARIN SODIUM 5000 UNIT(S): 5000 INJECTION INTRAVENOUS; SUBCUTANEOUS at 10:27

## 2023-12-22 RX ADMIN — Medication 125 MICROGRAM(S): at 10:27

## 2023-12-22 RX ADMIN — Medication 81 MILLIGRAM(S): at 10:27

## 2023-12-22 RX ADMIN — Medication 650 MILLIGRAM(S): at 10:49

## 2023-12-22 RX ADMIN — PANTOPRAZOLE SODIUM 40 MILLIGRAM(S): 20 TABLET, DELAYED RELEASE ORAL at 06:52

## 2023-12-22 RX ADMIN — Medication 5 MILLIGRAM(S): at 10:27

## 2023-12-22 RX ADMIN — Medication 100 MILLIGRAM(S): at 10:27

## 2023-12-22 NOTE — DISCHARGE NOTE PROVIDER - DETAILS OF MALNUTRITION DIAGNOSIS/DIAGNOSES
This patient has been assessed with a concern for Malnutrition and was treated during this hospitalization for the following Nutrition diagnosis/diagnoses:     -  12/20/2023: Severe protein-calorie malnutrition

## 2023-12-22 NOTE — PROGRESS NOTE ADULT - SUBJECTIVE AND OBJECTIVE BOX
Date of service: 12-22-23 @ 11:54    pt seen and examined   feels better  no fevers   going home today    ROS: no fever or chills; denies dizziness, no HA, no abdominal pain, no diarrhea or constipation; no dysuria, no urinary frequency, no legs pain, no rashes    MEDICATIONS  (STANDING):  aspirin enteric coated 81 milliGRAM(s) Oral daily  cefepime  Injectable. 2000 milliGRAM(s) IV Push <User Schedule>  doxycycline monohydrate Capsule 100 milliGRAM(s) Oral every 12 hours  heparin   Injectable 5000 Unit(s) SubCutaneous every 12 hours  levothyroxine 125 MICROGram(s) Oral daily  pantoprazole    Tablet 40 milliGRAM(s) Oral before breakfast  predniSONE   Tablet 5 milliGRAM(s) Oral daily    Vital Signs Last 24 Hrs  T(C): 36.4 (22 Dec 2023 09:32), Max: 36.8 (21 Dec 2023 21:17)  T(F): 97.6 (22 Dec 2023 09:32), Max: 98.2 (21 Dec 2023 21:17)  HR: 86 (22 Dec 2023 09:32) (80 - 86)  BP: 140/80 (22 Dec 2023 09:32) (131/86 - 154/86)  BP(mean): --  RR: 18 (22 Dec 2023 09:32) (17 - 18)  SpO2: 93% (22 Dec 2023 09:32) (93% - 95%)    Parameters below as of 22 Dec 2023 09:32  Patient On (Oxygen Delivery Method): room air      PE:  Constitutional: NAD   HEENT: NC/AT, EOMI, PERRLA, conjunctivae clear; ears and nose atraumatic; pharynx benign  Neck: supple; thyroid not palpable  Back: no tenderness  Respiratory: decreased breath sounds  Cardiovascular: S1S2 regular, no murmurs  Abdomen: soft, not tender, not distended, positive BS; liver and spleen WNL  Genitourinary: no suprapubic tenderness  Lymphatic: no LN palpable  Musculoskeletal: no muscle tenderness, no joint swelling or tenderness  Extremities: no pedal edema  Neurological/ Psychiatric: AxOx3, Judgement and insight normal;  moving all extremities  Skin: no rashes; no palpable lesions    Labs: all available labs reviewed                                   7.9    9.32  )-----------( 197      ( 22 Dec 2023 06:37 )             24.6     12-22    137  |  107  |  13  ----------------------------<  75  3.3<L>   |  26  |  1.15    Ca    8.9      22 Dec 2023 06:37              Urinalysis Basic - ( 20 Dec 2023 07:11 )    Color: x / Appearance: x / SG: x / pH: x  Gluc: 99 mg/dL / Ketone: x  / Bili: x / Urobili: x   Blood: x / Protein: x / Nitrite: x   Leuk Esterase: x / RBC: x / WBC x   Sq Epi: x / Non Sq Epi: x / Bacteria: x    Culture - Urine (12.19.23 @ 19:50)   Specimen Source: Clean Catch None  Culture Results:   <10,000 CFU/mL Normal Urogenital Jacquelyn  Culture - Blood (12.19.23 @ 18:51)   Specimen Source: .Blood Blood-Peripheral-aerobic plus recv'd  Culture Results:   No growth at 24 hours  Culture - Blood (12.19.23 @ 18:51)   Specimen Source: .Blood Blood-Peripheral-aerobic plus recv'd  Culture Results:   No growth at 24 hours    Radiology: all available radiological tests reviewed      ACC: [id number] EXAM:  XR CHEST PORTABLE IMMED 1V   ORDERED BY: MARIUM CORRALES     PROCEDURE DATE:  12/19/2023          INTERPRETATION:  TIME OF EXAM: December 19, 2023 at 6:59 PM.    CLINICAL INFORMATION: Sepsis.    COMPARISON:  April 26, 2023.    TECHNIQUE:   AP Portable chest x-ray.    INTERPRETATION:    Heart size is not accurately evaluated on this image.  There is an accessed CT injectable right IJ approach port with tip in the   SVC.  The thoracic aorta is calcified.  There is new biapical opacities. Question overlying soft tissues, pleural   thickening, or apical caps of pleural fluid.  There are new patchy left mid and lower lung opacities.  There is no right pleural effusion.  There is a trace left pleural effusion.  No pneumothorax is seen.  There is scoliosis of the spine with osteoarthritic degenerative change.      IMPRESSION:  New biapical opacities. Question overlying soft tissues,   pleural thickening, or apical caps of pleural fluid.    New patchy left mid and lower lung opacities, of indeterminate nature,   but which may be due to pneumonia. Suggest short-term follow-up chest   x-ray to evaluate for resolution and exclude other possible etiologies.      Advanced directives addressed: full resuscitation Date of service: 12-22-23 @ 11:54    pt seen and examined   feels better  no fevers   going home today    ROS: no fever or chills; denies dizziness, no HA, no abdominal pain, no diarrhea or constipation; no dysuria, no urinary frequency, no legs pain, no rashes    MEDICATIONS  (STANDING):  aspirin enteric coated 81 milliGRAM(s) Oral daily  cefepime  Injectable. 2000 milliGRAM(s) IV Push <User Schedule>  doxycycline monohydrate Capsule 100 milliGRAM(s) Oral every 12 hours  heparin   Injectable 5000 Unit(s) SubCutaneous every 12 hours  levothyroxine 125 MICROGram(s) Oral daily  pantoprazole    Tablet 40 milliGRAM(s) Oral before breakfast  predniSONE   Tablet 5 milliGRAM(s) Oral daily    Vital Signs Last 24 Hrs  T(C): 36.4 (22 Dec 2023 09:32), Max: 36.8 (21 Dec 2023 21:17)  T(F): 97.6 (22 Dec 2023 09:32), Max: 98.2 (21 Dec 2023 21:17)  HR: 86 (22 Dec 2023 09:32) (80 - 86)  BP: 140/80 (22 Dec 2023 09:32) (131/86 - 154/86)  BP(mean): --  RR: 18 (22 Dec 2023 09:32) (17 - 18)  SpO2: 93% (22 Dec 2023 09:32) (93% - 95%)    Parameters below as of 22 Dec 2023 09:32  Patient On (Oxygen Delivery Method): room air      PE:  Constitutional: NAD   HEENT: NC/AT, EOMI, PERRLA, conjunctivae clear; ears and nose atraumatic; pharynx benign  Neck: supple; thyroid not palpable  Back: no tenderness  Respiratory: decreased breath sounds  Cardiovascular: S1S2 regular, no murmurs  Abdomen: soft, not tender, not distended, positive BS; liver and spleen WNL  Genitourinary: no suprapubic tenderness  Lymphatic: no LN palpable  Musculoskeletal: no muscle tenderness, no joint swelling or tenderness  Extremities: no pedal edema  Neurological/ Psychiatric: AxOx3, Judgement and insight normal;  moving all extremities  Skin: no rashes; no palpable lesions    Labs: all available labs reviewed                                   7.9    9.32  )-----------( 197      ( 22 Dec 2023 06:37 )             24.6     12-22    137  |  107  |  13  ----------------------------<  75  3.3<L>   |  26  |  1.15    Ca    8.9      22 Dec 2023 06:37              Urinalysis Basic - ( 20 Dec 2023 07:11 )    Color: x / Appearance: x / SG: x / pH: x  Gluc: 99 mg/dL / Ketone: x  / Bili: x / Urobili: x   Blood: x / Protein: x / Nitrite: x   Leuk Esterase: x / RBC: x / WBC x   Sq Epi: x / Non Sq Epi: x / Bacteria: x    Culture - Urine (12.19.23 @ 19:50)   Specimen Source: Clean Catch None  Culture Results:   <10,000 CFU/mL Normal Urogenital Jacquelyn  Culture - Blood (12.19.23 @ 18:51)   Specimen Source: .Blood Blood-Peripheral-aerobic plus recv'd  Culture Results:   No growth at 24 hours  Culture - Blood (12.19.23 @ 18:51)   Specimen Source: .Blood Blood-Peripheral-aerobic plus recv'd  Culture Results:   No growth at 24 hours    Radiology: all available radiological tests reviewed      ACC: [id number] EXAM:  XR CHEST PORTABLE IMMED 1V   ORDERED BY: MARIUM CORRALES     PROCEDURE DATE:  12/19/2023          INTERPRETATION:  TIME OF EXAM: December 19, 2023 at 6:59 PM.    CLINICAL INFORMATION: Sepsis.    COMPARISON:  April 26, 2023.    TECHNIQUE:   AP Portable chest x-ray.    INTERPRETATION:    Heart size is not accurately evaluated on this image.  There is an accessed CT injectable right IJ approach port with tip in the   SVC.  The thoracic aorta is calcified.  There is new biapical opacities. Question overlying soft tissues, pleural   thickening, or apical caps of pleural fluid.  There are new patchy left mid and lower lung opacities.  There is no right pleural effusion.  There is a trace left pleural effusion.  No pneumothorax is seen.  There is scoliosis of the spine with osteoarthritic degenerative change.      IMPRESSION:  New biapical opacities. Question overlying soft tissues,   pleural thickening, or apical caps of pleural fluid.    New patchy left mid and lower lung opacities, of indeterminate nature,   but which may be due to pneumonia. Suggest short-term follow-up chest   x-ray to evaluate for resolution and exclude other possible etiologies.      Advanced directives addressed: full resuscitation

## 2023-12-22 NOTE — DISCHARGE NOTE PROVIDER - NSDCFUADDAPPT_GEN_ALL_CORE_FT
[facility] Follow [facility]oint[facility]  DR. Gruber  Date: 12/29/2023  Day: Friday  Time: 2:15pm   Phone: [phone] [facility] Follow [facility]  DR. Gruber  Date: 12/29/2023  Day: Friday  Time: 2:15pm   Phone: [phone]

## 2023-12-22 NOTE — PROGRESS NOTE ADULT - ASSESSMENT
77 y/o F w/ PMH of urothelial carcinoma w/ mets (on chemo???), CVA, osteoarthritis, IBS, hypothyroidism. 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 [place] [facility] [place] found to have BP of 70/40 and was also told she had pneumonia on CT scan and was referred to ED for further evaluation. Patient denies cough, runny nose, sore throat, fever, chills. Her only complaint is back pain 2/2 malignancy. Imaging remarkable for New biapical opacities. Question overlying soft tissues, pleural thickening, or apical caps of pleural fluid. New patchy left mid and lower lung opacities, of indeterminate nature, but which may be due to pneumonia. Started on IV abx - vancomycin/cefepime.     1. Hypotension. Multifocal pneumonia. Metastatic urothelial cancer on immunotherapy. Immunocompromised host  - imaging reviewed, hd stable, slowly improving   - on cefepime 2gmq daily 3-4  - on po doxycycline 100mg BID #3  - continue with abx coverage  - f/u cultures - no growth  - monitor temps  - tolerating abx well so far; no side effects noted  - reason for abx use and side effects reviewed with patient  - oncology f/u noted  - supportive care  - fu cbc  - dc on po ceftin x 7 day course, doxycycline x 5 days total     2. other issues - care per medicine  79 y/o F w/ PMH of urothelial carcinoma w/ mets (on chemo???), CVA, osteoarthritis, IBS, hypothyroidism. 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 Patient was at [facility] [place] and found to have BP of 70/40 and was also told she had pneumonia on CT scan and was referred to ED for further evaluation. Patient denies cough, runny nose, sore throat, fever, chills. Her only complaint is back pain 2/2 malignancy. Imaging remarkable for New biapical opacities. Question overlying soft tissues, pleural thickening, or apical caps of pleural fluid. New patchy left mid and lower lung opacities, of indeterminate nature, but which may be due to pneumonia. Started on IV abx - vancomycin/cefepime.     1. Hypotension. Multifocal pneumonia. Metastatic urothelial cancer on immunotherapy. Immunocompromised host  - imaging reviewed, hd stable, slowly improving   - on cefepime 2gmq daily 3-4  - on po doxycycline 100mg BID #3  - continue with abx coverage  - f/u cultures - no growth  - monitor temps  - tolerating abx well so far; no side effects noted  - reason for abx use and side effects reviewed with patient  - oncology f/u noted  - supportive care  - fu cbc  - dc on po ceftin x 7 day course, doxycycline x 5 days total     2. other issues - care per medicine

## 2023-12-22 NOTE — DISCHARGE NOTE PROVIDER - NSDCMRMEDTOKEN_GEN_ALL_CORE_FT
acetaminophen 325 mg oral tablet: 2 tab(s) orally every 24 hours as needed for  mild pain  aspirin 81 mg oral delayed release tablet: 1 tab(s) orally once a day  cefuroxime 500 mg oral tablet: 1 tab(s) orally 2 times a day  levothyroxine 125 mcg (0.125 mg) oral tablet: 1 tab(s) orally once a day  lidocaine 5% topical film: Apply topically to affected area once a day , apply for 12 hours on and 12 hours off.  magnesium amino acids chelate (as elemental magnesium) 133 mg oral tablet: 2 tab(s) orally once a day  OLANZapine 2.5 mg oral tablet: 1 tab(s) orally once a day (at bedtime) , Pt does NOT recognize this med  oxyCODONE 5 mg oral tablet: 1 tab(s) orally every 4 hours as needed for  severe pain , Pt weaned herself off and not longer uses  OxyCONTIN 10 mg oral tablet, extended release: 2 tab(s) orally 2 times a day  pantoprazole 40 mg oral delayed release tablet: 1 tab(s) orally once a day

## 2023-12-22 NOTE — DISCHARGE NOTE PROVIDER - HOSPITAL COURSE
77 y/o F w/ PMH of urothelial carcinoma w/ mets (on chemo), CVA, osteoarthritis, IBS, hypothyroidism. 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?), 2nd degree AV block (patient declined PPM), p/w hypotension    severe sepsis 2/2 HCAP (suspect gram negative PNA)  Despite IV bolus mido and IV abx, pressure is low  Lactate = 2   responded well  to fluids   COVID / Flu negative   ID and Heme consulted  Blood cultures are negative  Thyroid panel benign  CT chest with atypical pna /  US ++ left adrenal mass  juan level normal  thyroid level normal  Heme and ID consulted  dc home with ceftin      Adrenal mass left:  left sided mass noted  acth low  cortisol intact  will need outpatient endo referral from [facility]  Urothelial carcinoma / Anemia   no active bleeding  hb 12 in april  given one unit of prbc prior to dc    SAILAJA prerenal  -Renal U/S      H/o 2nd degree heart block  3 second pause on tele  Patient previously declined PPM  cardiology consulted; no acute intervention    *Advance Directives  -Patient's primary HCP is her son Yoan     *DVT ppx   -Heparin SubQ and trend H/H closely    79 y/o F w/ PMH of urothelial carcinoma w/ mets (on chemo), CVA, osteoarthritis, IBS, hypothyroidism. 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?), 2nd degree AV block (patient declined PPM), p/w hypotension    severe sepsis 2/2 HCAP (suspect gram negative PNA)  Despite IV bolus mido and IV abx, pressure is low  Lactate = 2   responded well  to fluids   COVID / Flu negative   ID and Heme consulted  Blood cultures are negative  Thyroid panel benign  CT chest with atypical pna /  US ++ left adrenal mass  juan level normal  thyroid level normal  Heme and ID consulted  dc home with ceftin      Adrenal mass left:  left sided mass noted  acth low  cortisol intact  will need outpatient endo referral from [facility]  Urothelial carcinoma / Anemia   no active bleeding  hb 12 in april  given one unit of prbc prior to dc    SAILAJA prerenal  -Renal U/S      H/o 2nd degree heart block  3 second pause on tele  Patient previously declined PPM  cardiology consulted; no acute intervention    *Advance Directives  -Patient's primary HCP is her son Yoan     *DVT ppx   -Heparin SubQ and trend H/H closely

## 2023-12-22 NOTE — DISCHARGE NOTE PROVIDER - NSDCCPCAREPLAN_GEN_ALL_CORE_FT
PRINCIPAL DISCHARGE DIAGNOSIS  Diagnosis: HCAP (healthcare-associated pneumonia)  Assessment and Plan of Treatment: Active  Medical Issues  HealthCare associated Pneumonia:  Your blood pressure and Pneumonia resolved with IV antibiotic  Please complete the course of PO antibiotics  Anemia: No active bleeding noted: you were given one unit of Blood  Please followup with your Oncologist in one week post discharge  Left adrenal mass (above Kidney): We discussed the case with Dr. Matthews. You will need a referral to an endocrinologist at [facility]  your Stress hormone level is intact  the hormone (ACTH) that stimulates the stress hormone is low     PRINCIPAL DISCHARGE DIAGNOSIS  Diagnosis: HCAP (healthcare-associated pneumonia)  Assessment and Plan of Treatment: Active  Medical Issues  HealthCare associated Pneumonia:  Your blood pressure and Pneumonia resolved with IV antibiotic  Please complete the course of PO antibiotics  Anemia: No active bleeding noted: you were given one unit of Blood  Please followup with your Oncologist in one week post discharge  Left adrenal mass (above Kidney): We discussed the case with Dr. Matthews. You will need a [place] [facility]  your Stress hormone level is intact  the hormone (ACTH) that stimulates the stress hormone is low

## 2023-12-23 ENCOUNTER — TRANSCRIPTION ENCOUNTER (OUTPATIENT)
Age: 78
End: 2023-12-23

## 2023-12-24 LAB
CORTICOSTEROID BINDING GLOBULIN RESULT: 1.5 MG/DL — LOW
CORTICOSTEROID BINDING GLOBULIN RESULT: 1.5 MG/DL — LOW
CORTIS F/TOTAL MFR SERPL: 12 % — SIGNIFICANT CHANGE UP
CORTIS F/TOTAL MFR SERPL: 12 % — SIGNIFICANT CHANGE UP
CORTIS SERPL-MCNC: 8.8 UG/DL — SIGNIFICANT CHANGE UP
CORTIS SERPL-MCNC: 8.8 UG/DL — SIGNIFICANT CHANGE UP
CORTISOL, FREE RESULT: 1.1 UG/DL — SIGNIFICANT CHANGE UP
CORTISOL, FREE RESULT: 1.1 UG/DL — SIGNIFICANT CHANGE UP

## 2023-12-25 LAB
CULTURE RESULTS: SIGNIFICANT CHANGE UP
SPECIMEN SOURCE: SIGNIFICANT CHANGE UP

## 2023-12-26 ENCOUNTER — TRANSCRIPTION ENCOUNTER (OUTPATIENT)
Age: 78
End: 2023-12-26

## 2023-12-27 DIAGNOSIS — M19.90 UNSPECIFIED OSTEOARTHRITIS, UNSPECIFIED SITE: ICD-10-CM

## 2023-12-27 DIAGNOSIS — Z87.891 PERSONAL HISTORY OF NICOTINE DEPENDENCE: ICD-10-CM

## 2023-12-27 DIAGNOSIS — I45.5 OTHER SPECIFIED HEART BLOCK: ICD-10-CM

## 2023-12-27 DIAGNOSIS — C77.9 SECONDARY AND UNSPECIFIED MALIGNANT NEOPLASM OF LYMPH NODE, UNSPECIFIED: ICD-10-CM

## 2023-12-27 DIAGNOSIS — Z79.82 LONG TERM (CURRENT) USE OF ASPIRIN: ICD-10-CM

## 2023-12-27 DIAGNOSIS — E43 UNSPECIFIED SEVERE PROTEIN-CALORIE MALNUTRITION: ICD-10-CM

## 2023-12-27 DIAGNOSIS — J15.69 PNEUMONIA DUE TO OTHER GRAM-NEGATIVE BACTERIA: ICD-10-CM

## 2023-12-27 DIAGNOSIS — Z79.890 HORMONE REPLACEMENT THERAPY: ICD-10-CM

## 2023-12-27 DIAGNOSIS — E27.9 DISORDER OF ADRENAL GLAND, UNSPECIFIED: ICD-10-CM

## 2023-12-27 DIAGNOSIS — N17.9 ACUTE KIDNEY FAILURE, UNSPECIFIED: ICD-10-CM

## 2023-12-27 DIAGNOSIS — C78.7 SECONDARY MALIGNANT NEOPLASM OF LIVER AND INTRAHEPATIC BILE DUCT: ICD-10-CM

## 2023-12-27 DIAGNOSIS — D84.9 IMMUNODEFICIENCY, UNSPECIFIED: ICD-10-CM

## 2023-12-27 DIAGNOSIS — C79.51 SECONDARY MALIGNANT NEOPLASM OF BONE: ICD-10-CM

## 2023-12-27 DIAGNOSIS — A41.50 GRAM-NEGATIVE SEPSIS, UNSPECIFIED: ICD-10-CM

## 2023-12-27 DIAGNOSIS — E03.9 HYPOTHYROIDISM, UNSPECIFIED: ICD-10-CM

## 2023-12-27 DIAGNOSIS — C68.8 MALIGNANT NEOPLASM OF OVERLAPPING SITES OF URINARY ORGANS: ICD-10-CM

## 2023-12-27 DIAGNOSIS — E87.1 HYPO-OSMOLALITY AND HYPONATREMIA: ICD-10-CM

## 2023-12-27 DIAGNOSIS — Z86.73 PERSONAL HISTORY OF TRANSIENT ISCHEMIC ATTACK (TIA), AND CEREBRAL INFARCTION WITHOUT RESIDUAL DEFICITS: ICD-10-CM

## 2023-12-27 DIAGNOSIS — D63.0 ANEMIA IN NEOPLASTIC DISEASE: ICD-10-CM

## 2023-12-27 DIAGNOSIS — R65.21 SEVERE SEPSIS WITH SEPTIC SHOCK: ICD-10-CM

## 2023-12-27 DIAGNOSIS — Z11.52 ENCOUNTER FOR SCREENING FOR COVID-19: ICD-10-CM
